# Patient Record
Sex: FEMALE | Race: WHITE | NOT HISPANIC OR LATINO | URBAN - METROPOLITAN AREA
[De-identification: names, ages, dates, MRNs, and addresses within clinical notes are randomized per-mention and may not be internally consistent; named-entity substitution may affect disease eponyms.]

---

## 2017-06-08 ENCOUNTER — INPATIENT (INPATIENT)
Facility: HOSPITAL | Age: 57
LOS: 0 days | Discharge: HOME | End: 2017-06-09
Attending: OBSTETRICS & GYNECOLOGY | Admitting: OBSTETRICS & GYNECOLOGY
Payer: COMMERCIAL

## 2017-06-08 VITALS — HEIGHT: 64 IN | WEIGHT: 162.92 LBS

## 2017-06-08 LAB
ALBUMIN SERPL ELPH-MCNC: 3.4 G/DL — SIGNIFICANT CHANGE UP (ref 3.3–5)
ALP SERPL-CCNC: 77 U/L — SIGNIFICANT CHANGE UP (ref 40–120)
ALT FLD-CCNC: 23 U/L — SIGNIFICANT CHANGE UP (ref 12–78)
ANION GAP SERPL CALC-SCNC: 2 MMOL/L — LOW (ref 5–17)
APPEARANCE UR: CLEAR — SIGNIFICANT CHANGE UP
AST SERPL-CCNC: 33 U/L — SIGNIFICANT CHANGE UP (ref 15–37)
BASOPHILS # BLD AUTO: 0.1 K/UL — SIGNIFICANT CHANGE UP (ref 0–0.2)
BASOPHILS NFR BLD AUTO: 1.1 % — SIGNIFICANT CHANGE UP (ref 0–2)
BILIRUB SERPL-MCNC: 0.7 MG/DL — SIGNIFICANT CHANGE UP (ref 0.2–1.2)
BILIRUB UR-MCNC: NEGATIVE — SIGNIFICANT CHANGE UP
BUN SERPL-MCNC: 12 MG/DL — SIGNIFICANT CHANGE UP (ref 7–23)
CALCIUM SERPL-MCNC: 8.8 MG/DL — SIGNIFICANT CHANGE UP (ref 8.5–10.1)
CHLORIDE SERPL-SCNC: 105 MMOL/L — SIGNIFICANT CHANGE UP (ref 96–108)
CO2 SERPL-SCNC: 29 MMOL/L — SIGNIFICANT CHANGE UP (ref 22–31)
COLOR SPEC: YELLOW — SIGNIFICANT CHANGE UP
CREAT SERPL-MCNC: 0.72 MG/DL — SIGNIFICANT CHANGE UP (ref 0.5–1.3)
DIFF PNL FLD: NEGATIVE — SIGNIFICANT CHANGE UP
EOSINOPHIL # BLD AUTO: 0.1 K/UL — SIGNIFICANT CHANGE UP (ref 0–0.5)
EOSINOPHIL NFR BLD AUTO: 0.5 % — SIGNIFICANT CHANGE UP (ref 0–6)
GLUCOSE SERPL-MCNC: 105 MG/DL — HIGH (ref 70–99)
GLUCOSE UR QL: NEGATIVE MG/DL — SIGNIFICANT CHANGE UP
HCG SERPL-ACNC: 5 MIU/ML — SIGNIFICANT CHANGE UP
HCT VFR BLD CALC: 48.5 % — HIGH (ref 34.5–45)
HGB BLD-MCNC: 16.4 G/DL — HIGH (ref 11.5–15.5)
KETONES UR-MCNC: NEGATIVE — SIGNIFICANT CHANGE UP
LEUKOCYTE ESTERASE UR-ACNC: NEGATIVE — SIGNIFICANT CHANGE UP
LYMPHOCYTES # BLD AUTO: 2.7 K/UL — SIGNIFICANT CHANGE UP (ref 1–3.3)
LYMPHOCYTES # BLD AUTO: 24 % — SIGNIFICANT CHANGE UP (ref 13–44)
MCHC RBC-ENTMCNC: 31.8 PG — SIGNIFICANT CHANGE UP (ref 27–34)
MCHC RBC-ENTMCNC: 33.9 GM/DL — SIGNIFICANT CHANGE UP (ref 32–36)
MCV RBC AUTO: 93.9 FL — SIGNIFICANT CHANGE UP (ref 80–100)
MONOCYTES # BLD AUTO: 0.8 K/UL — SIGNIFICANT CHANGE UP (ref 0–0.9)
MONOCYTES NFR BLD AUTO: 7.3 % — SIGNIFICANT CHANGE UP (ref 2–14)
NEUTROPHILS # BLD AUTO: 7.5 K/UL — HIGH (ref 1.8–7.4)
NEUTROPHILS NFR BLD AUTO: 67.1 % — SIGNIFICANT CHANGE UP (ref 43–77)
NITRITE UR-MCNC: NEGATIVE — SIGNIFICANT CHANGE UP
PH UR: 7 — SIGNIFICANT CHANGE UP (ref 5–8)
PLATELET # BLD AUTO: 187 K/UL — SIGNIFICANT CHANGE UP (ref 150–400)
POTASSIUM SERPL-MCNC: 4.8 MMOL/L — SIGNIFICANT CHANGE UP (ref 3.5–5.3)
POTASSIUM SERPL-SCNC: 4.8 MMOL/L — SIGNIFICANT CHANGE UP (ref 3.5–5.3)
PROT SERPL-MCNC: 7.8 GM/DL — SIGNIFICANT CHANGE UP (ref 6–8.3)
PROT UR-MCNC: NEGATIVE MG/DL — SIGNIFICANT CHANGE UP
RBC # BLD: 5.16 M/UL — SIGNIFICANT CHANGE UP (ref 3.8–5.2)
RBC # FLD: 13.1 % — SIGNIFICANT CHANGE UP (ref 10.3–14.5)
SODIUM SERPL-SCNC: 136 MMOL/L — SIGNIFICANT CHANGE UP (ref 135–145)
SP GR SPEC: 1.01 — SIGNIFICANT CHANGE UP (ref 1.01–1.02)
UROBILINOGEN FLD QL: NEGATIVE MG/DL — SIGNIFICANT CHANGE UP
WBC # BLD: 11.2 K/UL — HIGH (ref 3.8–10.5)
WBC # FLD AUTO: 11.2 K/UL — HIGH (ref 3.8–10.5)

## 2017-06-08 PROCEDURE — 74177 CT ABD & PELVIS W/CONTRAST: CPT | Mod: 26

## 2017-06-08 PROCEDURE — 76856 US EXAM PELVIC COMPLETE: CPT | Mod: 26

## 2017-06-08 RX ORDER — MORPHINE SULFATE 50 MG/1
4 CAPSULE, EXTENDED RELEASE ORAL ONCE
Qty: 0 | Refills: 0 | Status: DISCONTINUED | OUTPATIENT
Start: 2017-06-08 | End: 2017-06-08

## 2017-06-08 RX ORDER — SODIUM CHLORIDE 9 MG/ML
1000 INJECTION INTRAMUSCULAR; INTRAVENOUS; SUBCUTANEOUS ONCE
Qty: 0 | Refills: 0 | Status: COMPLETED | OUTPATIENT
Start: 2017-06-08 | End: 2017-06-08

## 2017-06-08 RX ADMIN — MORPHINE SULFATE 4 MILLIGRAM(S): 50 CAPSULE, EXTENDED RELEASE ORAL at 21:10

## 2017-06-08 RX ADMIN — MORPHINE SULFATE 4 MILLIGRAM(S): 50 CAPSULE, EXTENDED RELEASE ORAL at 20:55

## 2017-06-08 RX ADMIN — MORPHINE SULFATE 4 MILLIGRAM(S): 50 CAPSULE, EXTENDED RELEASE ORAL at 21:30

## 2017-06-08 RX ADMIN — MORPHINE SULFATE 4 MILLIGRAM(S): 50 CAPSULE, EXTENDED RELEASE ORAL at 21:15

## 2017-06-08 RX ADMIN — MORPHINE SULFATE 4 MILLIGRAM(S): 50 CAPSULE, EXTENDED RELEASE ORAL at 23:42

## 2017-06-08 RX ADMIN — MORPHINE SULFATE 4 MILLIGRAM(S): 50 CAPSULE, EXTENDED RELEASE ORAL at 23:55

## 2017-06-08 RX ADMIN — SODIUM CHLORIDE 1000 MILLILITER(S): 9 INJECTION INTRAMUSCULAR; INTRAVENOUS; SUBCUTANEOUS at 20:55

## 2017-06-08 NOTE — ED STATDOCS - PROGRESS NOTE DETAILS
signed Daija Song PA-C Pt seen initially in intake by Dr. Asencio   no external genital lesions, no discharge, bimanual exam no CMT, uterus non tender, not enlarged, no adnexal tenderness or masses. trace blood on tips of glove. Exam chaperoned by LISA Barrientos d/w Dr. guillen for ob/gyn for consult. Attending Elif, plan admit to ob/gyn Attending corbin Asencio/usman de jesus for ob/gyn for consult.

## 2017-06-08 NOTE — ED STATDOCS - ATTENDING CONTRIBUTION TO CARE
I, Madi Asencio MD,  performed the initial face to face bedside interview with this patient regarding history of present illness, review of symptoms and relevant past medical, social and family history.  I completed an independent physical examination.  I was the initial provider who evaluated this patient. I have signed out the follow up of any pending tests (i.e. labs, radiological studies) to the ACP.  I have communicated the patient’s plan of care and disposition with the ACP.  The history, relevant review of systems, past medical and surgical history, medical decision making, and physical examination was documented by the scribe in my presence and I attest to the accuracy of the documentation.

## 2017-06-08 NOTE — ED STATDOCS - OBJECTIVE STATEMENT
55 y/o F 55 y/o F with a h/o , hypothyroid on synthroid, c/o intermittent bright red vaginal bleeding x3 days in the mornings, then would eventually resolve on its own. Denies nausea. Pt noticed the blood after wiping. Denies if she saw blood in toilet. Denies rectal bleeding. Pt has been changing a pad one time per day. Pt c/o lower abd pain since yesterday. Pain worsens with movement, and worsens with bumps in the road with driving. Pt is menopausal. PCP- Nuria. KETTY. Pt current smoker. No illicit drug use. Drinks alcohol on occasion. 57 y/o F with a h/o , hypothyroid on synthroid, c/o intermittent bright red vaginal bleeding x3 days in the mornings, then would eventually resolve on its own. Denies nausea. Pt noticed the blood after wiping. Denies if she saw blood in toilet. Denies rectal bleeding. Pt has been changing a pad one time per day. Pt c/o lower abd pain since yesterday. Pain worsens with movement, and worsens with bumps in the road with driving. Pt had an episode of urinary incontinence a few days ago. She says she drinks water frequently, and "does not urine until the very last minute", and this has been occurring over the passed few months. Pt is menopausal. PCP- Nuria. KETTY. Pt current smoker. No illicit drug use. Drinks alcohol on occasion.

## 2017-06-09 VITALS
TEMPERATURE: 99 F | SYSTOLIC BLOOD PRESSURE: 142 MMHG | OXYGEN SATURATION: 92 % | RESPIRATION RATE: 18 BRPM | DIASTOLIC BLOOD PRESSURE: 88 MMHG | HEART RATE: 86 BPM

## 2017-06-09 DIAGNOSIS — Z29.9 ENCOUNTER FOR PROPHYLACTIC MEASURES, UNSPECIFIED: ICD-10-CM

## 2017-06-09 DIAGNOSIS — N83.201 UNSPECIFIED OVARIAN CYST, RIGHT SIDE: ICD-10-CM

## 2017-06-09 DIAGNOSIS — E03.9 HYPOTHYROIDISM, UNSPECIFIED: ICD-10-CM

## 2017-06-09 LAB
ABO RH CONFIRMATION: SIGNIFICANT CHANGE UP
ALBUMIN SERPL ELPH-MCNC: 3 G/DL — LOW (ref 3.3–5)
ALP SERPL-CCNC: 62 U/L — SIGNIFICANT CHANGE UP (ref 40–120)
ALT FLD-CCNC: 20 U/L — SIGNIFICANT CHANGE UP (ref 12–78)
ANION GAP SERPL CALC-SCNC: 4 MMOL/L — LOW (ref 5–17)
APTT BLD: 30.3 SEC — SIGNIFICANT CHANGE UP (ref 27.5–37.4)
AST SERPL-CCNC: 11 U/L — LOW (ref 15–37)
BASOPHILS # BLD AUTO: 0.1 K/UL — SIGNIFICANT CHANGE UP (ref 0–0.2)
BASOPHILS NFR BLD AUTO: 0.7 % — SIGNIFICANT CHANGE UP (ref 0–2)
BILIRUB SERPL-MCNC: 0.6 MG/DL — SIGNIFICANT CHANGE UP (ref 0.2–1.2)
BLD GP AB SCN SERPL QL: SIGNIFICANT CHANGE UP
BUN SERPL-MCNC: 8 MG/DL — SIGNIFICANT CHANGE UP (ref 7–23)
CALCIUM SERPL-MCNC: 8.3 MG/DL — LOW (ref 8.5–10.1)
CANCER AG125 SERPL-ACNC: 16 U/ML — SIGNIFICANT CHANGE UP
CEA SERPL-MCNC: 3 NG/ML — SIGNIFICANT CHANGE UP (ref 0–3.8)
CHLORIDE SERPL-SCNC: 107 MMOL/L — SIGNIFICANT CHANGE UP (ref 96–108)
CO2 SERPL-SCNC: 28 MMOL/L — SIGNIFICANT CHANGE UP (ref 22–31)
CREAT SERPL-MCNC: 0.46 MG/DL — LOW (ref 0.5–1.3)
CULTURE RESULTS: SIGNIFICANT CHANGE UP
EOSINOPHIL # BLD AUTO: 0.1 K/UL — SIGNIFICANT CHANGE UP (ref 0–0.5)
EOSINOPHIL NFR BLD AUTO: 0.6 % — SIGNIFICANT CHANGE UP (ref 0–6)
GLUCOSE SERPL-MCNC: 88 MG/DL — SIGNIFICANT CHANGE UP (ref 70–99)
HCT VFR BLD CALC: 43 % — SIGNIFICANT CHANGE UP (ref 34.5–45)
HGB BLD-MCNC: 14.9 G/DL — SIGNIFICANT CHANGE UP (ref 11.5–15.5)
INR BLD: 1.11 RATIO — SIGNIFICANT CHANGE UP (ref 0.88–1.16)
LYMPHOCYTES # BLD AUTO: 2.4 K/UL — SIGNIFICANT CHANGE UP (ref 1–3.3)
LYMPHOCYTES # BLD AUTO: 28 % — SIGNIFICANT CHANGE UP (ref 13–44)
MCHC RBC-ENTMCNC: 32.1 PG — SIGNIFICANT CHANGE UP (ref 27–34)
MCHC RBC-ENTMCNC: 34.7 GM/DL — SIGNIFICANT CHANGE UP (ref 32–36)
MCV RBC AUTO: 92.5 FL — SIGNIFICANT CHANGE UP (ref 80–100)
MONOCYTES # BLD AUTO: 0.7 K/UL — SIGNIFICANT CHANGE UP (ref 0–0.9)
MONOCYTES NFR BLD AUTO: 7.8 % — SIGNIFICANT CHANGE UP (ref 2–14)
NEUTROPHILS # BLD AUTO: 5.4 K/UL — SIGNIFICANT CHANGE UP (ref 1.8–7.4)
NEUTROPHILS NFR BLD AUTO: 62.9 % — SIGNIFICANT CHANGE UP (ref 43–77)
PLATELET # BLD AUTO: 149 K/UL — LOW (ref 150–400)
POTASSIUM SERPL-MCNC: 3.5 MMOL/L — SIGNIFICANT CHANGE UP (ref 3.5–5.3)
POTASSIUM SERPL-SCNC: 3.5 MMOL/L — SIGNIFICANT CHANGE UP (ref 3.5–5.3)
PROT SERPL-MCNC: 6.5 GM/DL — SIGNIFICANT CHANGE UP (ref 6–8.3)
PROTHROM AB SERPL-ACNC: 12 SEC — SIGNIFICANT CHANGE UP (ref 9.8–12.7)
RBC # BLD: 4.65 M/UL — SIGNIFICANT CHANGE UP (ref 3.8–5.2)
RBC # FLD: 13 % — SIGNIFICANT CHANGE UP (ref 10.3–14.5)
SODIUM SERPL-SCNC: 139 MMOL/L — SIGNIFICANT CHANGE UP (ref 135–145)
SPECIMEN SOURCE: SIGNIFICANT CHANGE UP
TYPE + AB SCN PNL BLD: SIGNIFICANT CHANGE UP
WBC # BLD: 8.6 K/UL — SIGNIFICANT CHANGE UP (ref 3.8–10.5)
WBC # FLD AUTO: 8.6 K/UL — SIGNIFICANT CHANGE UP (ref 3.8–10.5)

## 2017-06-09 PROCEDURE — 93010 ELECTROCARDIOGRAM REPORT: CPT

## 2017-06-09 PROCEDURE — 99285 EMERGENCY DEPT VISIT HI MDM: CPT

## 2017-06-09 PROCEDURE — 71010: CPT | Mod: 26

## 2017-06-09 RX ORDER — SODIUM CHLORIDE 9 MG/ML
1000 INJECTION INTRAMUSCULAR; INTRAVENOUS; SUBCUTANEOUS
Qty: 0 | Refills: 0 | Status: DISCONTINUED | OUTPATIENT
Start: 2017-06-09 | End: 2017-06-09

## 2017-06-09 RX ORDER — MORPHINE SULFATE 50 MG/1
4 CAPSULE, EXTENDED RELEASE ORAL EVERY 6 HOURS
Qty: 0 | Refills: 0 | Status: DISCONTINUED | OUTPATIENT
Start: 2017-06-09 | End: 2017-06-09

## 2017-06-09 RX ORDER — MORPHINE SULFATE 50 MG/1
2 CAPSULE, EXTENDED RELEASE ORAL
Qty: 0 | Refills: 0 | Status: DISCONTINUED | OUTPATIENT
Start: 2017-06-09 | End: 2017-06-09

## 2017-06-09 RX ORDER — LEVOTHYROXINE SODIUM 125 MCG
100 TABLET ORAL DAILY
Qty: 0 | Refills: 0 | Status: DISCONTINUED | OUTPATIENT
Start: 2017-06-09 | End: 2017-06-09

## 2017-06-09 RX ADMIN — MORPHINE SULFATE 2 MILLIGRAM(S): 50 CAPSULE, EXTENDED RELEASE ORAL at 02:15

## 2017-06-09 RX ADMIN — MORPHINE SULFATE 4 MILLIGRAM(S): 50 CAPSULE, EXTENDED RELEASE ORAL at 06:49

## 2017-06-09 RX ADMIN — MORPHINE SULFATE 2 MILLIGRAM(S): 50 CAPSULE, EXTENDED RELEASE ORAL at 04:15

## 2017-06-09 RX ADMIN — Medication 100 MICROGRAM(S): at 02:16

## 2017-06-09 RX ADMIN — SODIUM CHLORIDE 125 MILLILITER(S): 9 INJECTION INTRAMUSCULAR; INTRAVENOUS; SUBCUTANEOUS at 02:16

## 2017-06-09 RX ADMIN — MORPHINE SULFATE 2 MILLIGRAM(S): 50 CAPSULE, EXTENDED RELEASE ORAL at 02:41

## 2017-06-09 RX ADMIN — MORPHINE SULFATE 2 MILLIGRAM(S): 50 CAPSULE, EXTENDED RELEASE ORAL at 04:30

## 2017-06-09 RX ADMIN — MORPHINE SULFATE 2 MILLIGRAM(S): 50 CAPSULE, EXTENDED RELEASE ORAL at 10:01

## 2017-06-09 RX ADMIN — MORPHINE SULFATE 2 MILLIGRAM(S): 50 CAPSULE, EXTENDED RELEASE ORAL at 10:30

## 2017-06-09 NOTE — DISCHARGE NOTE ADULT - PLAN OF CARE
to not have further pain - follow up with GYN Surgery Dr Sepulveda @1600 today   - u/s and ct results to be faxed to Dr Sepulveda office  - medical clearance via PCP Dr Quiñones at 1215 today to not have abnormal thyroid levels - take medications as prescribed   - follow up with PMD

## 2017-06-09 NOTE — H&P ADULT - NSHPSOCIALHISTORY_GEN_ALL_CORE
, lives at home with spouse, Current smoker- 2ppd x 45years, social drinker, denies recreational drug use

## 2017-06-09 NOTE — DISCHARGE NOTE ADULT - ADDITIONAL INSTRUCTIONS
Patient has a long smoking history with ?emphysema will need medical clearance prior to possible gyn surgery for ovarian cysts. To follow up with PCP Dr Quiñones @1215 today and GYN Surgery Dr Sepulveda 1600 today for further management     If you have fever >100.3, uncontrolled abdominal pain, chest pain, shortness of breath, headache with vision changes, severe nausea vomiting or diarrhea come to ED or call PMD immediately

## 2017-06-09 NOTE — H&P ADULT - PROBLEM SELECTOR PLAN 1
-Admit to GYN  -Pain management  -Continue IVF- NS @ 125 cc/hr  -Will discuss case with Dr. Sepulveda/Dr. Zuluaga in AM to further evaluate need for surgery

## 2017-06-09 NOTE — DISCHARGE NOTE ADULT - PATIENT PORTAL LINK FT
“You can access the [facility] Patient Portal, offered by [facility], by registering with the following website: [email or web address]”

## 2017-06-09 NOTE — PROGRESS NOTE ADULT - SUBJECTIVE AND OBJECTIVE BOX
Patient seen and evaluated at bedside. She is still feeling pain but significantly improved.     Tumor markers CEA and  were drawn earlier. Dr. Zuluaga and Dr. Sepulveda unavailable to perform surgery today. Case was also discussed with GYN Oncologist, Dr. Martinez, who offered to see patient today as outpatient. Discussed options with patient and she would like to see Dr. Sepulveda and feels that she can be evaluated as outpatient, as her pain is better controlled at this time. Was able to schedule an appointment today with Dr. Sepulveda at 16:00.    Medicine team was consulted for medical clearance. EKG/chest X ray done. Since patient will not be having surgery today, she can complete medical clearance as outpatient and was able to schedule a visit with her PCP later today as well.

## 2017-06-09 NOTE — DISCHARGE NOTE ADULT - CARE PROVIDER_API CALL
Janak Quiñones), Family Medicine  [address] [place] [address]  Phone: [phone]  Fax: [phone]    Nader Sepulveda), Obstetrics and Gynecology  [address] [place] [address]  Phone: [phone]  Fax: [phone]

## 2017-06-09 NOTE — DISCHARGE NOTE ADULT - CARE PLAN
Principal Discharge DX:	Cyst of right ovary  Goal:	to not have further pain  Instructions for follow-up, activity and diet:	- follow up with GYN Surgery Dr Sepulveda @1600 today   - u/s and ct results to be faxed to Dr Sepulveda office  - medical clearance via PCP Dr Quiñones at 1215 today  Secondary Diagnosis:	Hypothyroid  Goal:	to not have abnormal thyroid levels  Instructions for follow-up, activity and diet:	- take medications as prescribed   - follow up with PMD

## 2017-06-09 NOTE — H&P ADULT - ASSESSMENT
57 y/o F PMHx hypothyroidism presents to ED with complaint of vaginal bleeding x 3 days and severe RLQ pain x 1 day being admitted for large R ovarian cystic lesion

## 2017-06-09 NOTE — H&P ADULT - HISTORY OF PRESENT ILLNESS
55 y/o F PMHx hypothyroidism presents to ED with complaint of vaginal bleeding x 3 days and severe RLQ pain x 1 day. Patient states she last had her menstrual period 6 years ago. States 3 days PTA, in AM, when she went to [facility], she noticed bright red blood on the toilet paper. Patient denied any pain at that time, denied any blood in urine or blood in toilet bowl. Patient states she applied a pad, but most bleeding resolved on its own. Patient had same experience 2 days PTA. Patient states yesterday, she began to notice pain in her RLQ and suprapubic region. States initially she was able to work through it, but pain became sharp and stabbing (8/10 in severity). Patient states she was unable to get up from laying down position without help from her . Patient states pain is worse with movement, better with rest. Since pain persisted, patient decided to come to ED for evaluation.    In ED, patient received morphine 4mg IV x3, 1L NS bolus x1. Labs significant for leukocytosis- WBC 11.2, UA neg. Patient had pelvic US which showed large right ovarian cystic lesions, Patient reported pain during the examination, clinical correlation is advised to assess for ovarian torsion as focal lesions may serve as a lead point, given the size. Neoplasm cannot be excluded in a postmenopausal patient. Patient had CT abd/pelvis which showed large right adnexal cystic lesion. Clinical relation is advised to assess ovarian torsion as focal lesion may serve as a lead point (given the size). Neoplasm cannot be excluded in a postmenopausal patient and follow-up pelvic MRI is recommended for further evaluation.

## 2017-06-09 NOTE — CHART NOTE - NSCHARTNOTEFT_GEN_A_CORE
outpatient medical appointments scheduled for patient     PCP Dr Quiñones- 1215 today    OBGYN Surgery Dr Sepulveda 1600today outpatient medical appointments for follow up scheduled for patient by me     PCP Dr Quiñones- 1215 today    OBGYN Surgery Dr Sepulveda 1600today outpatient medical appointments for follow up scheduled for patient by me     PCP Dr Quiñones- 1215 today    OBGYN Surgery Dr Sepulveda 1600 today

## 2017-06-09 NOTE — PROGRESS NOTE ADULT - ASSESSMENT
55 yo P1 with RLQ pain and large ovarian cystic mass seen on imaging  -pain improved  -will d/c home and will follow up with PCP and Dr. Sepulveda in the office later today

## 2017-06-09 NOTE — DISCHARGE NOTE ADULT - HOSPITAL COURSE
55 y/o F PMHx hypothyroidism presents to ED with complaint of vaginal bleeding x 3 days and severe RLQ pain x 1 day. Patient states she last had her menstrual period 6 years ago. States 3 days PTA, in AM, when she went to pee, she noticed bright red blood on the toilet paper.     Patient was admitted to GYN service and given pain relief. CEA and  drawn    Patient being discharged in stable condition with appropriate follow up with PCP Dr Quiñones and GYN Surgery Dr Sepulveda for today at 1215 and 1600 respectively.

## 2017-06-09 NOTE — DISCHARGE NOTE ADULT - MEDICATION SUMMARY - MEDICATIONS TO TAKE
I will START or STAY ON the medications listed below when I get home from the hospital:    oxycodone-acetaminophen 5mg-325mg oral tablet  -- 1 tab(s) by mouth every 6 hours, As Needed -for severe pain MDD:MDD 4  -- Caution federal law prohibits the transfer of this drug to any person other  than the person for whom it was prescribed.  May cause drowsiness.  Alcohol may intensify this effect.  Use care when operating dangerous machinery.  This prescription cannot be refilled.  This product contains acetaminophen.  Do not use  with any other product containing acetaminophen to prevent possible liver damage.  Using more of this medication than prescribed may cause serious breathing problems.    -- Indication: For pain    levothyroxine 100 mcg (0.1 mg) oral tablet  -- 1 tab(s) by mouth once a day  -- Indication: For Hypothyroid

## 2017-06-11 ENCOUNTER — INPATIENT (INPATIENT)
Facility: HOSPITAL | Age: 57
LOS: 7 days | Discharge: HOME | DRG: 742 | End: 2017-06-19
Attending: OBSTETRICS & GYNECOLOGY | Admitting: OBSTETRICS & GYNECOLOGY
Payer: COMMERCIAL

## 2017-06-11 VITALS
OXYGEN SATURATION: 97 % | RESPIRATION RATE: 16 BRPM | HEART RATE: 110 BPM | TEMPERATURE: 99 F | SYSTOLIC BLOOD PRESSURE: 131 MMHG | DIASTOLIC BLOOD PRESSURE: 85 MMHG

## 2017-06-11 DIAGNOSIS — F17.200 NICOTINE DEPENDENCE, UNSPECIFIED, UNCOMPLICATED: ICD-10-CM

## 2017-06-11 DIAGNOSIS — R10.9 UNSPECIFIED ABDOMINAL PAIN: ICD-10-CM

## 2017-06-11 DIAGNOSIS — R10.2 PELVIC AND PERINEAL PAIN: ICD-10-CM

## 2017-06-11 DIAGNOSIS — E03.9 HYPOTHYROIDISM, UNSPECIFIED: ICD-10-CM

## 2017-06-11 DIAGNOSIS — N95.0 POSTMENOPAUSAL BLEEDING: ICD-10-CM

## 2017-06-11 LAB
ALBUMIN SERPL ELPH-MCNC: 3.8 G/DL — SIGNIFICANT CHANGE UP (ref 3.3–5)
ALP SERPL-CCNC: 64 U/L — SIGNIFICANT CHANGE UP (ref 40–120)
ALT FLD-CCNC: 19 U/L RC — SIGNIFICANT CHANGE UP (ref 10–45)
ANION GAP SERPL CALC-SCNC: 13 MMOL/L — SIGNIFICANT CHANGE UP (ref 5–17)
APTT BLD: 29 SEC — SIGNIFICANT CHANGE UP (ref 27.5–37.4)
AST SERPL-CCNC: 21 U/L — SIGNIFICANT CHANGE UP (ref 10–40)
BASOPHILS # BLD AUTO: 0 K/UL — SIGNIFICANT CHANGE UP (ref 0–0.2)
BASOPHILS NFR BLD AUTO: 0.2 % — SIGNIFICANT CHANGE UP (ref 0–2)
BILIRUB SERPL-MCNC: 0.3 MG/DL — SIGNIFICANT CHANGE UP (ref 0.2–1.2)
BLD GP AB SCN SERPL QL: NEGATIVE — SIGNIFICANT CHANGE UP
BUN SERPL-MCNC: 11 MG/DL — SIGNIFICANT CHANGE UP (ref 7–23)
CALCIUM SERPL-MCNC: 9.6 MG/DL — SIGNIFICANT CHANGE UP (ref 8.4–10.5)
CHLORIDE SERPL-SCNC: 101 MMOL/L — SIGNIFICANT CHANGE UP (ref 96–108)
CO2 SERPL-SCNC: 26 MMOL/L — SIGNIFICANT CHANGE UP (ref 22–31)
CREAT SERPL-MCNC: 0.73 MG/DL — SIGNIFICANT CHANGE UP (ref 0.5–1.3)
EOSINOPHIL # BLD AUTO: 0 K/UL — SIGNIFICANT CHANGE UP (ref 0–0.5)
EOSINOPHIL NFR BLD AUTO: 0.5 % — SIGNIFICANT CHANGE UP (ref 0–6)
GLUCOSE SERPL-MCNC: 122 MG/DL — HIGH (ref 70–99)
HCG UR QL: NEGATIVE — SIGNIFICANT CHANGE UP
HCT VFR BLD CALC: 45.9 % — HIGH (ref 34.5–45)
HGB BLD-MCNC: 15.7 G/DL — HIGH (ref 11.5–15.5)
INR BLD: 1.1 RATIO — SIGNIFICANT CHANGE UP (ref 0.88–1.16)
LYMPHOCYTES # BLD AUTO: 2.3 K/UL — SIGNIFICANT CHANGE UP (ref 1–3.3)
LYMPHOCYTES # BLD AUTO: 24.8 % — SIGNIFICANT CHANGE UP (ref 13–44)
MCHC RBC-ENTMCNC: 32.9 PG — SIGNIFICANT CHANGE UP (ref 27–34)
MCHC RBC-ENTMCNC: 34.1 GM/DL — SIGNIFICANT CHANGE UP (ref 32–36)
MCV RBC AUTO: 96.4 FL — SIGNIFICANT CHANGE UP (ref 80–100)
MONOCYTES # BLD AUTO: 0.6 K/UL — SIGNIFICANT CHANGE UP (ref 0–0.9)
MONOCYTES NFR BLD AUTO: 6.2 % — SIGNIFICANT CHANGE UP (ref 2–14)
NEUTROPHILS # BLD AUTO: 6.4 K/UL — SIGNIFICANT CHANGE UP (ref 1.8–7.4)
NEUTROPHILS NFR BLD AUTO: 68.3 % — SIGNIFICANT CHANGE UP (ref 43–77)
PLATELET # BLD AUTO: 176 K/UL — SIGNIFICANT CHANGE UP (ref 150–400)
POTASSIUM SERPL-MCNC: 4.2 MMOL/L — SIGNIFICANT CHANGE UP (ref 3.5–5.3)
POTASSIUM SERPL-SCNC: 4.2 MMOL/L — SIGNIFICANT CHANGE UP (ref 3.5–5.3)
PROT SERPL-MCNC: 7.5 G/DL — SIGNIFICANT CHANGE UP (ref 6–8.3)
PROTHROM AB SERPL-ACNC: 11.9 SEC — SIGNIFICANT CHANGE UP (ref 9.8–12.7)
RBC # BLD: 4.76 M/UL — SIGNIFICANT CHANGE UP (ref 3.8–5.2)
RBC # FLD: 12.8 % — SIGNIFICANT CHANGE UP (ref 10.3–14.5)
RH IG SCN BLD-IMP: POSITIVE — SIGNIFICANT CHANGE UP
RH IG SCN BLD-IMP: POSITIVE — SIGNIFICANT CHANGE UP
SODIUM SERPL-SCNC: 140 MMOL/L — SIGNIFICANT CHANGE UP (ref 135–145)
WBC # BLD: 9.3 K/UL — SIGNIFICANT CHANGE UP (ref 3.8–10.5)
WBC # FLD AUTO: 9.3 K/UL — SIGNIFICANT CHANGE UP (ref 3.8–10.5)

## 2017-06-11 PROCEDURE — 99285 EMERGENCY DEPT VISIT HI MDM: CPT

## 2017-06-11 PROCEDURE — 88305 TISSUE EXAM BY PATHOLOGIST: CPT | Mod: 26

## 2017-06-11 PROCEDURE — 58120 DILATION AND CURETTAGE: CPT | Mod: 59

## 2017-06-11 PROCEDURE — 58720 REMOVAL OF OVARY/TUBE(S): CPT

## 2017-06-11 PROCEDURE — 99223 1ST HOSP IP/OBS HIGH 75: CPT

## 2017-06-11 RX ORDER — NICOTINE POLACRILEX 2 MG
1 GUM BUCCAL DAILY
Qty: 0 | Refills: 0 | Status: DISCONTINUED | OUTPATIENT
Start: 2017-06-11 | End: 2017-06-13

## 2017-06-11 RX ORDER — SODIUM CHLORIDE 9 MG/ML
1000 INJECTION INTRAMUSCULAR; INTRAVENOUS; SUBCUTANEOUS ONCE
Qty: 0 | Refills: 0 | Status: COMPLETED | OUTPATIENT
Start: 2017-06-11 | End: 2017-06-11

## 2017-06-11 RX ORDER — SODIUM CHLORIDE 9 MG/ML
1000 INJECTION, SOLUTION INTRAVENOUS
Qty: 0 | Refills: 0 | Status: DISCONTINUED | OUTPATIENT
Start: 2017-06-11 | End: 2017-06-11

## 2017-06-11 RX ORDER — KETOROLAC TROMETHAMINE 30 MG/ML
30 SYRINGE (ML) INJECTION EVERY 6 HOURS
Qty: 0 | Refills: 0 | Status: DISCONTINUED | OUTPATIENT
Start: 2017-06-11 | End: 2017-06-13

## 2017-06-11 RX ORDER — LEVOTHYROXINE SODIUM 125 MCG
1 TABLET ORAL
Qty: 0 | Refills: 0 | COMMUNITY

## 2017-06-11 RX ORDER — SODIUM CHLORIDE 9 MG/ML
1000 INJECTION, SOLUTION INTRAVENOUS
Qty: 0 | Refills: 0 | Status: DISCONTINUED | OUTPATIENT
Start: 2017-06-11 | End: 2017-06-12

## 2017-06-11 RX ORDER — LEVOTHYROXINE SODIUM 125 MCG
100 TABLET ORAL DAILY
Qty: 0 | Refills: 0 | Status: DISCONTINUED | OUTPATIENT
Start: 2017-06-11 | End: 2017-06-13

## 2017-06-11 RX ORDER — NICOTINE POLACRILEX 2 MG
1 GUM BUCCAL DAILY
Qty: 0 | Refills: 0 | Status: DISCONTINUED | OUTPATIENT
Start: 2017-06-11 | End: 2017-06-11

## 2017-06-11 RX ORDER — MORPHINE SULFATE 50 MG/1
4 CAPSULE, EXTENDED RELEASE ORAL ONCE
Qty: 0 | Refills: 0 | Status: DISCONTINUED | OUTPATIENT
Start: 2017-06-11 | End: 2017-06-11

## 2017-06-11 RX ORDER — MORPHINE SULFATE 50 MG/1
4 CAPSULE, EXTENDED RELEASE ORAL EVERY 4 HOURS
Qty: 0 | Refills: 0 | Status: DISCONTINUED | OUTPATIENT
Start: 2017-06-11 | End: 2017-06-11

## 2017-06-11 RX ADMIN — Medication 1 PATCH: at 22:19

## 2017-06-11 RX ADMIN — MORPHINE SULFATE 4 MILLIGRAM(S): 50 CAPSULE, EXTENDED RELEASE ORAL at 21:30

## 2017-06-11 RX ADMIN — SODIUM CHLORIDE 1000 MILLILITER(S): 9 INJECTION INTRAMUSCULAR; INTRAVENOUS; SUBCUTANEOUS at 18:21

## 2017-06-11 RX ADMIN — Medication 30 MILLIGRAM(S): at 22:46

## 2017-06-11 RX ADMIN — Medication 100 MICROGRAM(S): at 22:37

## 2017-06-11 RX ADMIN — Medication 30 MILLIGRAM(S): at 23:16

## 2017-06-11 RX ADMIN — MORPHINE SULFATE 4 MILLIGRAM(S): 50 CAPSULE, EXTENDED RELEASE ORAL at 18:26

## 2017-06-11 NOTE — H&P ADULT - NSHPPHYSICALEXAM_GEN_ALL_CORE
Vital Signs Last 24 Hrs  T(C): 36.7, Max: 37.2 (06-11 @ 17:05)  T(F): 98, Max: 99 (06-11 @ 17:05)  HR: 102 (102 - 110)  BP: 131/92 (131/85 - 131/92)  BP(mean): --  RR: 16 (16 - 16)  SpO2: 95% (95% - 97%)    PHYSICAL EXAM:      Gen: NAD, alert and oriented x 3    Cardiovascular: regular rate and rhythm, S1 and S2 present     Respiratory: CTAB    Abd: soft, mild RLQ tenderness, non-distended, + bowel sounds. No rebound, guarding    Pelvic:  no CMT, Uterus: normal size, non tender, brown vaginal lochia noted    Adnexa: R adnexal fullness    Extremities: NTBL    Skin: warm and well perfused

## 2017-06-11 NOTE — H&P ADULT - REASON FOR ADMISSION
RLQ pain, vaginal spotting, found to have 14cm R adnexal mass RLQ pain, vaginal spotting, found to have 15cm R adnexal mass

## 2017-06-11 NOTE — ED PROVIDER NOTE - OBJECTIVE STATEMENT
56 year old female with PMHx of Graves disease, hypothyroidism on Synthroid, 7 years post menopausal, presents with worsening right sided pelvic pain since Thursday. Pt states she noted mild vaginal bleeding on Tuesday and then developed pain. States pain is worsened with movement. Unable to ambulate 2/2 to pain. Went to [facility] ED and found to have 14 cm  right ovarian cyst. Pt was admitted for surgery but was unable to have surgery there because they did not have gyn/onc. Pt was discharged and referred to outpatient gyn/onc but was not able to get surgery done because of insurance complication. So pt presented today for worsening pain. No recent fevers, chills, N/V, urinary changes, CP, SOB or other complaints.

## 2017-06-11 NOTE — ED ADULT NURSE NOTE - OBJECTIVE STATEMENT
pt to room 14 c/o severe r sided abd pain. pt dx with ovarian mass . pt awake alert color good skin warm dry lungs cl abd soft bloated tender to pal on r side. pt casmr to ed to have surg.

## 2017-06-11 NOTE — H&P ADULT - PROBLEM SELECTOR PLAN 1
most likely 2/2 15cm R adnexal mass seen on CT A/P, TVUS (6/9)  -morphine for analagesia  -tumor markers in AM   -for ONC eval in AM  -for possible surgery tomorrow as add-on  -for medical clearance  -IVF, NPO after midnight

## 2017-06-11 NOTE — PATIENT PROFILE ADULT. - VISION (WITH CORRECTIVE LENSES IF THE PATIENT USUALLY WEARS THEM):
Partially impaired: cannot see medication labels or newsprint, but can see obstacles in path, and the surrounding layout; can count fingers at arm's length/distance

## 2017-06-11 NOTE — ED PROVIDER NOTE - NS ED MD SCRIBE ATTENDING SCRIBE SECTIONS
HIV/INTAKE ASSESSMENT/SCREENINGS/PHYSICAL EXAM/HISTORY OF PRESENT ILLNESS/VITAL SIGNS( Pullset)/PAST MEDICAL/SURGICAL/SOCIAL HISTORY/REVIEW OF SYSTEMS

## 2017-06-11 NOTE — H&P ADULT - HISTORY OF PRESENT ILLNESS
56 , LMP  presents with vaginal spotting that began Tuesday (), and subsequent acute RLQ pain that began Thursday (). Pt was evaluated at [place] [facility] (), TVUS and CT scan showing 15cm R adnexal cyst with few septations, homogenous in nature. Pt was initially scheduled for surgery ; however, surgery was canceled as GYN wanted GYN/ONC as backup. Pt was d/c with percocet and was to f/u with Dr. Sepulveda. Pt was unable to f/u due to insurance complications and presented in ED today with persistent pain. Pt reports pain as sharp and pulling in nature. Pain is worsened with movement, pt is unable to ambulate. Pt denies nausea, vomiting, fevers, chills, chest pain, SOB, anorexia, changes in GI/ fxn.    OB/GYN HISTORY:   G1: C/S  x1       Last Menstrual Period:     Name of GYN Physician: Dr. Bettina Griffiths" [phone], possibly retired  Date of Last Pap:  History of Abnormal Pap: no abnormal pap  Date of Last Mammogram: , wnl  Date of Last Colonoscopy: N/A  Contraception: none currently, was on Depo 7 years prior     PAST MEDICAL & SURGICAL HISTORY:  Hypothyroid  Delivered by  section      MEDICATIONS  (STANDING):  nicotine - 21 mG/24Hr(s) Patch 1patch Transdermal daily  lactated ringers. 1000milliLiter(s) IV Continuous <Continuous>    Allergies    No Known Allergies    Intolerances        SOCIAL HISTORY: current smoker- 2 pack/day x40yrs    FAMILY HISTORY:  No pertinent family history in first degree relatives

## 2017-06-11 NOTE — H&P ADULT - ATTENDING COMMENTS
Pt with large 15cm complex right ovarian cyst and abdominal pain. Given second visit to ED w/in one week, admit for management.     Pt seen and examined. Agree with above resident note with the following additions. Pt to be admitted to GYN. GYN ONC consult in AM. Will send completion of tumor markers in AM. Endometrial biopsy tonight (rush). NPO after midnight. IVF. Toradol prn pain. Morphine for severe pain. Nicotine patch. If pt to go to OR this admission, will get medical clearance preop.    KAVEH Gastelum

## 2017-06-12 DIAGNOSIS — Z01.818 ENCOUNTER FOR OTHER PREPROCEDURAL EXAMINATION: ICD-10-CM

## 2017-06-12 DIAGNOSIS — R19.00 INTRA-ABDOMINAL AND PELVIC SWELLING, MASS AND LUMP, UNSPECIFIED SITE: ICD-10-CM

## 2017-06-12 LAB
BLD GP AB SCN SERPL QL: NEGATIVE — SIGNIFICANT CHANGE UP
CANCER AG19-9 SERPL-ACNC: 12.2 U/ML — SIGNIFICANT CHANGE UP
HCG-TM SERPL-ACNC: 3 MIU/ML — SIGNIFICANT CHANGE UP
LDH SERPL L TO P-CCNC: 150 U/L — SIGNIFICANT CHANGE UP (ref 50–242)
RH IG SCN BLD-IMP: POSITIVE — SIGNIFICANT CHANGE UP

## 2017-06-12 PROCEDURE — 99223 1ST HOSP IP/OBS HIGH 75: CPT | Mod: GC

## 2017-06-12 RX ORDER — MORPHINE SULFATE 50 MG/1
2 CAPSULE, EXTENDED RELEASE ORAL ONCE
Qty: 0 | Refills: 0 | Status: DISCONTINUED | OUTPATIENT
Start: 2017-06-12 | End: 2017-06-12

## 2017-06-12 RX ORDER — SODIUM CHLORIDE 9 MG/ML
1000 INJECTION, SOLUTION INTRAVENOUS
Qty: 0 | Refills: 0 | Status: DISCONTINUED | OUTPATIENT
Start: 2017-06-12 | End: 2017-06-13

## 2017-06-12 RX ORDER — HEPARIN SODIUM 5000 [USP'U]/ML
5000 INJECTION INTRAVENOUS; SUBCUTANEOUS EVERY 12 HOURS
Qty: 0 | Refills: 0 | Status: DISCONTINUED | OUTPATIENT
Start: 2017-06-12 | End: 2017-06-13

## 2017-06-12 RX ORDER — MORPHINE SULFATE 50 MG/1
4 CAPSULE, EXTENDED RELEASE ORAL EVERY 4 HOURS
Qty: 0 | Refills: 0 | Status: DISCONTINUED | OUTPATIENT
Start: 2017-06-12 | End: 2017-06-13

## 2017-06-12 RX ADMIN — HEPARIN SODIUM 5000 UNIT(S): 5000 INJECTION INTRAVENOUS; SUBCUTANEOUS at 18:32

## 2017-06-12 RX ADMIN — Medication 30 MILLIGRAM(S): at 09:36

## 2017-06-12 RX ADMIN — Medication 30 MILLIGRAM(S): at 05:29

## 2017-06-12 RX ADMIN — MORPHINE SULFATE 4 MILLIGRAM(S): 50 CAPSULE, EXTENDED RELEASE ORAL at 23:58

## 2017-06-12 RX ADMIN — Medication 1 PATCH: at 13:24

## 2017-06-12 RX ADMIN — MORPHINE SULFATE 2 MILLIGRAM(S): 50 CAPSULE, EXTENDED RELEASE ORAL at 00:05

## 2017-06-12 RX ADMIN — MORPHINE SULFATE 2 MILLIGRAM(S): 50 CAPSULE, EXTENDED RELEASE ORAL at 00:35

## 2017-06-12 RX ADMIN — Medication 1 PATCH: at 22:07

## 2017-06-12 RX ADMIN — Medication 30 MILLIGRAM(S): at 04:59

## 2017-06-12 RX ADMIN — MORPHINE SULFATE 4 MILLIGRAM(S): 50 CAPSULE, EXTENDED RELEASE ORAL at 18:33

## 2017-06-12 RX ADMIN — MORPHINE SULFATE 4 MILLIGRAM(S): 50 CAPSULE, EXTENDED RELEASE ORAL at 19:00

## 2017-06-12 RX ADMIN — MORPHINE SULFATE 4 MILLIGRAM(S): 50 CAPSULE, EXTENDED RELEASE ORAL at 13:24

## 2017-06-12 RX ADMIN — MORPHINE SULFATE 4 MILLIGRAM(S): 50 CAPSULE, EXTENDED RELEASE ORAL at 14:00

## 2017-06-12 RX ADMIN — Medication 30 MILLIGRAM(S): at 10:00

## 2017-06-12 RX ADMIN — Medication 100 MICROGRAM(S): at 23:58

## 2017-06-12 NOTE — PROGRESS NOTE ADULT - SUBJECTIVE AND OBJECTIVE BOX
HD#2    Patient seen and examined at bedside, no acute overnight events. Pt c/o pain which is well controlled overnight with Morphine @ 10pm and Toradol @ 5am. NPO overnight.     Patient is ambulating, passing flatus, voiding spontaneously with frequency. Denies CP, SOB, N/V, fevers, and chills.    Vital Signs Last 24 Hours  T(C): 36.5, Max: 37.2 (06-11 @ 17:05)  HR: 80 (80 - 110)  BP: 147/81 (127/84 - 147/81)  RR: 16 (16 - 17)  SpO2: 95% (94% - 97%)    I&O's Summary    I & Os for current day (as of 12 Jun 2017 07:07)  =============================================  IN: 1990 ml / OUT: 0 ml / NET: 1990 ml    Physical Exam:  General: NAD  CV: NR, RR, S1, S2, no M/R/G  Lungs: CTA-B  Abdomen: nondistended, Soft, +BS, mild tenderness in RLQ/lower abdomen   Ext: No pain or swelling    Labs:                        15.7   9.3   )-----------( 176      ( 11 Jun 2017 18:41 )             45.9   baso 0.2    eos 0.5    imm gran x      lymph 24.8   mono 6.2    poly 68.3     06-11    140  |  101  |  11  ----------------------------<  122<H>  4.2   |  26  |  0.73    Ca    9.6      11 Jun 2017 18:41    TPro  7.5  /  Alb  3.8  /  TBili  0.3  /  DBili  x   /  AST  21  /  ALT  19  /  AlkPhos  64  06-11    PT/INR - ( 11 Jun 2017 18:41 )   PT: 11.9 sec;   INR: 1.10 ratio       PTT - ( 11 Jun 2017 18:41 )  PTT:29.0 sec    Blood Type: O Positive    MEDICATIONS  (STANDING):  nicotine - 21 mG/24Hr(s) Patch 1patch Transdermal daily  levothyroxine 100MICROGram(s) Oral daily  lactated ringers. 1000milliLiter(s) IV Continuous <Continuous>    MEDICATIONS  (PRN):  ketorolac   Injectable 30milliGRAM(s) IV Push every 6 hours PRN Mild Pain (1 - 3)    CTAP:     FINDINGS:    LOWER CHEST: Subsegmental atelectasis. Paraseptal/centrilobular emphysema.    LIVER: A 1.1 x 1.0 cm left hepatic cyst.  GALLBLADDER/BILE DUCTS: No intrahepatic or extrahepatic biliary   dilatation. Cholelithiasis.   PANCREAS: Unremarkable.  SPLEEN: Unremarkable.    ADRENALS: Unremarkable.  KIDNEYS/URETERS: No hydronephrosis, hydroureter or perinephric stranding.   Subcentimeter hypodense focus in the left lower renal pole, too small to   characterize.   BLADDER: Unremarkable.    REPRODUCTIVE ORGANS: Again noted large cystic lesion in the right adnexa   measuring up to 15.2 x 14.7 x 15.5 cm. Unremarkable uterus and left   adnexa.    BOWEL: No bowel obstruction. Unremarkable appendix. No significant bowel   wall thickening or inflammatory change.  Colon diverticulosis.  PERITONEUM: No drainable fluid collection or free air. Trace free fluid   in the right lower quadrant and pelvis.  VESSELS: Atherosclerotic change of the abdominal aorta and its branches.   RETROPERITONEUM: No lymphadenopathy.    ABDOMINAL WALL/SOFT TISSUES: Small fat-containing umbilical hernia.  BONES: Degenerative changes of the spine.     IMPRESSION:     Large right adnexal cystic lesion. Clinical relation is advised to assess  ovarian torsion as focal lesion may serve as a lead point (given the   size). Neoplasm cannot be excluded in a postmenopausal patient and   follow-up pelvic MRI is recommended for further evaluation.      TVUS:    Findings:     Uterus: Measures 6.2 x 2.3 x 3.3 cm. Unremarkable.  Endometrium: Measures 0.2 cm in thickness.   Right ovary: Measures 14.4 x 16.0 x 19.1 cm. Cysts measuring 14.2 x 8.9 x   15.3 cm and 6.7 x 4.5 x 6.7 cm. Flow present.   Left ovary: Measures 0.9 x 0.7 x 0.7 cm. Unremarkable. Flow present.   Additional: No adnexal mass. Trace free fluid.    Impression:    Large right ovarian cystic lesion. Patient reported pain during the examination.   Clinical correlation is advised to assess for ovariantorsion as focal   lesions may serve as a lead point, given the size. Neoplasm cannot be   excluded in a postmenopausal patient and follow-up (MRI) is also   recommended for further evaluation. HD#2    Patient seen and examined at bedside, no acute overnight events. Pt c/o pain which is well controlled overnight with Morphine @ 10pm and Toradol @ 5am. NPO overnight.     Patient is ambulating, passing flatus, voiding spontaneously with frequency. Denies CP, SOB, N/V, fevers, and chills.    Vital Signs Last 24 Hours  T(C): 36.5, Max: 37.2 (06-11 @ 17:05)  HR: 80 (80 - 110)  BP: 147/81 (127/84 - 147/81)  RR: 16 (16 - 17)  SpO2: 95% (94% - 97%)    I&O's Summary    I & Os for current day (as of 12 Jun 2017 07:07)  =============================================  IN: 1990 ml / OUT: 0 ml / NET: 1990 ml    Physical Exam:  General: NAD  CV: NR, RR, S1, S2, no M/R/G  Lungs: CTA-B  Abdomen: nondistended, Soft, +BS, mild tenderness in RLQ/lower abdomen   Ext: No pain or swelling    Labs:                        15.7   9.3   )-----------( 176      ( 11 Jun 2017 18:41 )             45.9   baso 0.2    eos 0.5    imm gran x      lymph 24.8   mono 6.2    poly 68.3     06-11    140  |  101  |  11  ----------------------------<  122<H>  4.2   |  26  |  0.73    Ca    9.6      11 Jun 2017 18:41    TPro  7.5  /  Alb  3.8  /  TBili  0.3  /  DBili  x   /  AST  21  /  ALT  19  /  AlkPhos  64  06-11    PT/INR - ( 11 Jun 2017 18:41 )   PT: 11.9 sec;   INR: 1.10 ratio       PTT - ( 11 Jun 2017 18:41 )  PTT:29.0 sec    Blood Type: O Positive    Ca125: 16  CEA: 3      MEDICATIONS  (STANDING):  nicotine - 21 mG/24Hr(s) Patch 1patch Transdermal daily  levothyroxine 100MICROGram(s) Oral daily  lactated ringers. 1000milliLiter(s) IV Continuous <Continuous>    MEDICATIONS  (PRN):  ketorolac   Injectable 30milliGRAM(s) IV Push every 6 hours PRN Mild Pain (1 - 3)    CTAP:     FINDINGS:    LOWER CHEST: Subsegmental atelectasis. Paraseptal/centrilobular emphysema.    LIVER: A 1.1 x 1.0 cm left hepatic cyst.  GALLBLADDER/BILE DUCTS: No intrahepatic or extrahepatic biliary   dilatation. Cholelithiasis.   PANCREAS: Unremarkable.  SPLEEN: Unremarkable.    ADRENALS: Unremarkable.  KIDNEYS/URETERS: No hydronephrosis, hydroureter or perinephric stranding.   Subcentimeter hypodense focus in the left lower renal pole, too small to   characterize.   BLADDER: Unremarkable.    REPRODUCTIVE ORGANS: Again noted large cystic lesion in the right adnexa   measuring up to 15.2 x 14.7 x 15.5 cm. Unremarkable uterus and left   adnexa.    BOWEL: No bowel obstruction. Unremarkable appendix. No significant bowel   wall thickening or inflammatory change.  Colon diverticulosis.  PERITONEUM: No drainable fluid collection or free air. Trace free fluid   in the right lower quadrant and pelvis.  VESSELS: Atherosclerotic change of the abdominal aorta and its branches.   RETROPERITONEUM: No lymphadenopathy.    ABDOMINAL WALL/SOFT TISSUES: Small fat-containing umbilical hernia.  BONES: Degenerative changes of the spine.     IMPRESSION:     Large right adnexal cystic lesion. Clinical relation is advised to assess  ovarian torsion as focal lesion may serve as a lead point (given the   size). Neoplasm cannot be excluded in a postmenopausal patient and   follow-up pelvic MRI is recommended for further evaluation.      TVUS:    Findings:     Uterus: Measures 6.2 x 2.3 x 3.3 cm. Unremarkable.  Endometrium: Measures 0.2 cm in thickness.   Right ovary: Measures 14.4 x 16.0 x 19.1 cm. Cysts measuring 14.2 x 8.9 x   15.3 cm and 6.7 x 4.5 x 6.7 cm. Flow present.   Left ovary: Measures 0.9 x 0.7 x 0.7 cm. Unremarkable. Flow present.   Additional: No adnexal mass. Trace free fluid.    Impression:    Large right ovarian cystic lesion. Patient reported pain during the examination.   Clinical correlation is advised to assess for ovariantorsion as focal   lesions may serve as a lead point, given the size. Neoplasm cannot be   excluded in a postmenopausal patient and follow-up (MRI) is also   recommended for further evaluation.

## 2017-06-12 NOTE — PROGRESS NOTE ADULT - PROBLEM SELECTOR PLAN 1
Neuro: c/w IV analgesia   CV: Hemodynamically stable  Pulm: Saturating well on room air, IS as Pt is current 60-pack year smoker. Continue Nicotine patch   GI: Reg diet, as medicine/oncology workup is needed prior to OR  : voiding spontaneously  Heme: HSQ and SCDs for DVT ppx  Dispo: Medicine consult for OR clearance, GYN Oncology consult for evaluation

## 2017-06-12 NOTE — CONSULT NOTE ADULT - PROBLEM SELECTOR RECOMMENDATION 9
-surgery likely tomorrow per gyn, awaiting gyn onc eval  -malignancy w/u per gyn onc  -pain control per gyn

## 2017-06-12 NOTE — CONSULT NOTE ADULT - SUBJECTIVE AND OBJECTIVE BOX
Patient is a 56y old  Female who presents with a chief complaint of RLQ pain, vaginal spotting, found to have 15cm R adnexal mass. Pt initially presented to [facility] for RLQ pain and post menopausal vaginal bleeding. Pt was initially scheduled for surgery but then there was a scheduling conflict so she was discharged to follow up as an outpatient       SUBJECTIVE / OVERNIGHT EVENTS:    MEDICATIONS  (STANDING):  nicotine - 21 mG/24Hr(s) Patch 1patch Transdermal daily  levothyroxine 100MICROGram(s) Oral daily  lactated ringers. 1000milliLiter(s) IV Continuous <Continuous>  heparin  Injectable 5000Unit(s) SubCutaneous every 12 hours    MEDICATIONS  (PRN):  ketorolac   Injectable 30milliGRAM(s) IV Push every 6 hours PRN Mild Pain (1 - 3)      Vital Signs Last 24 Hrs  T(C): 36.6, Max: 37.2 (06-11 @ 17:05)  T(F): 97.8, Max: 99 (06-11 @ 17:05)  HR: 94 (80 - 110)  BP: 121/74 (121/74 - 147/81)  BP(mean): --  RR: 18 (16 - 18)  SpO2: 95% (94% - 97%)  CAPILLARY BLOOD GLUCOSE    I&O's Summary    I & Os for current day (as of 12 Jun 2017 09:41)  =============================================  IN: 2605 ml / OUT: 0 ml / NET: 2605 ml      PHYSICAL EXAM:  CONSTIPATION: NAD, well-developed  HEAD:  Atraumatic, Normocephalic  EYES: EOMI, PERRLA, conjunctiva and sclera clear  ENMT: Supple, No JVD  RESPIRATORY: Clear to auscultation bilaterally; No wheeze  CARDIOVASCULAR: Regular rate and rhythm; No murmurs, rubs, or gallops  GI: Soft, Nontender, Nondistended; Bowel sounds present  EXTREMITIES:  2+ Peripheral Pulses, No clubbing, cyanosis, or edema  PSYCH: AAOx3  NEUROLOGY: non-focal  SKIN: No rashes or lesions    LABS:                        15.7   9.3   )-----------( 176      ( 11 Jun 2017 18:41 )             45.9     06-11    140  |  101  |  11  ----------------------------<  122<H>  4.2   |  26  |  0.73    Ca    9.6      11 Jun 2017 18:41    TPro  7.5  /  Alb  3.8  /  TBili  0.3  /  DBili  x   /  AST  21  /  ALT  19  /  AlkPhos  64  06-11    PT/INR - ( 11 Jun 2017 18:41 )   PT: 11.9 sec;   INR: 1.10 ratio         PTT - ( 11 Jun 2017 18:41 )  PTT:29.0 sec          RADIOLOGY & ADDITIONAL TESTS:    Imaging Personally Reviewed:    Consultant(s) Notes Reviewed:      Care Discussed with Consultants/Other Providers: 56f who presents with a chief complaint of RLQ pain, vaginal spotting, found to have 15cm R adnexal mass. Pt initially presented to [facility] for RLQ pain and post menopausal vaginal bleeding. Pt was initially scheduled for surgery but then there was a scheduling conflict so she was discharged to follow up as an outpatient. Pt could not go to her appointment because of insurance issues and pain became worse so she presented to [facility] 6/11 for worsening pain. Pt notes pain is a bit improved now with pain control. Denies cp/sob/n/v/f/c/decreased appetite/diarrhea/unintentional weight loss. Is a self proclaimed "gym rat", enjoys swimming, chris etc. Able to climb multiple flights of stairs without difficulty. Current smoker, is trying to quit, down to 1 ppd from 2ppd. Denies PND/orthopnea/wheezing/cough production      SUBJECTIVE / OVERNIGHT EVENTS:    MEDICATIONS  (STANDING):  nicotine - 21 mG/24Hr(s) Patch 1patch Transdermal daily  levothyroxine 100MICROGram(s) Oral daily  lactated ringers. 1000milliLiter(s) IV Continuous <Continuous>  heparin  Injectable 5000Unit(s) SubCutaneous every 12 hours    MEDICATIONS  (PRN):  ketorolac   Injectable 30milliGRAM(s) IV Push every 6 hours PRN Mild Pain (1 - 3)      Vital Signs Last 24 Hrs  T(C): 36.6, Max: 37.2 (06-11 @ 17:05)  T(F): 97.8, Max: 99 (06-11 @ 17:05)  HR: 94 (80 - 110)  BP: 121/74 (121/74 - 147/81)  BP(mean): --  RR: 18 (16 - 18)  SpO2: 95% (94% - 97%)  CAPILLARY BLOOD GLUCOSE    I&O's Summary    I & Os for current day (as of 12 Jun 2017 09:41)  =============================================  IN: 2605 ml / OUT: 0 ml / NET: 2605 ml      PHYSICAL EXAM:  CONSTIPATION: NAD, well-developed  HEAD:  Atraumatic, Normocephalic  EYES: EOMI, PERRLA, conjunctiva and sclera clear  ENMT: Supple, No JVD  RESPIRATORY: Clear to auscultation bilaterally; No wheeze  CARDIOVASCULAR: Regular rate and rhythm; No murmurs, rubs, or gallops  GI: Soft, Nontender, Nondistended; Bowel sounds present  EXTREMITIES:  2+ Peripheral Pulses, No clubbing, cyanosis, or edema  PSYCH: AAOx3  NEUROLOGY: non-focal  SKIN: No rashes or lesions    LABS:                        15.7   9.3   )-----------( 176      ( 11 Jun 2017 18:41 )             45.9     06-11    140  |  101  |  11  ----------------------------<  122<H>  4.2   |  26  |  0.73    Ca    9.6      11 Jun 2017 18:41    TPro  7.5  /  Alb  3.8  /  TBili  0.3  /  DBili  x   /  AST  21  /  ALT  19  /  AlkPhos  64  06-11    PT/INR - ( 11 Jun 2017 18:41 )   PT: 11.9 sec;   INR: 1.10 ratio         PTT - ( 11 Jun 2017 18:41 )  PTT:29.0 sec          RADIOLOGY & ADDITIONAL TESTS:    Imaging Personally Reviewed:    Consultant(s) Notes Reviewed:      Care Discussed with Consultants/Other Providers: 56f graves s/p GRIMALDO now on synthroid who presents with a chief complaint of RLQ pain, vaginal spotting, found to have 15cm R adnexal mass. Pt initially presented to [facility] for RLQ pain and post menopausal vaginal bleeding. Pt was initially scheduled for surgery but then there was a scheduling conflict so she was discharged to follow up as an outpatient. Pt could not go to her appointment because of insurance issues and pain became worse so she presented to [facility] 6/11 for worsening pain. Pt notes pain is a bit improved now with pain control. Denies cp/sob/n/v/f/c/decreased appetite/diarrhea/unintentional weight loss. Is a self proclaimed "gym rat", enjoys swimming, chris etc. Able to climb multiple flights of stairs without difficulty. Current smoker, is trying to quit, down to 1 ppd from 2ppd. Denies PND/orthopnea/wheezing/cough production      SUBJECTIVE / OVERNIGHT EVENTS:    MEDICATIONS  (STANDING):  nicotine - 21 mG/24Hr(s) Patch 1patch Transdermal daily  levothyroxine 100MICROGram(s) Oral daily  lactated ringers. 1000milliLiter(s) IV Continuous <Continuous>  heparin  Injectable 5000Unit(s) SubCutaneous every 12 hours    MEDICATIONS  (PRN):  ketorolac   Injectable 30milliGRAM(s) IV Push every 6 hours PRN Mild Pain (1 - 3)      Vital Signs Last 24 Hrs  T(C): 36.6, Max: 37.2 (06-11 @ 17:05)  T(F): 97.8, Max: 99 (06-11 @ 17:05)  HR: 94 (80 - 110)  BP: 121/74 (121/74 - 147/81)  BP(mean): --  RR: 18 (16 - 18)  SpO2: 95% (94% - 97%)  CAPILLARY BLOOD GLUCOSE    I&O's Summary    I & Os for current day (as of 12 Jun 2017 09:41)  =============================================  IN: 2605 ml / OUT: 0 ml / NET: 2605 ml      PHYSICAL EXAM:  CONSTIPATION: NAD, well-developed  HEAD:  Atraumatic, Normocephalic  EYES: EOMI, PERRLA, conjunctiva and sclera clear  ENMT: Supple, No JVD  RESPIRATORY: Clear to auscultation bilaterally; No wheeze  CARDIOVASCULAR: Regular rate and rhythm; No murmurs, rubs, or gallops  GI: Soft, ttp RLQ Nondistended; Bowel sounds present  EXTREMITIES:  2+ Peripheral Pulses, No clubbing, cyanosis, or edema  PSYCH: AAOx3  NEUROLOGY: non-focal  SKIN: No rashes or lesions    LABS:                        15.7   9.3   )-----------( 176      ( 11 Jun 2017 18:41 )             45.9     06-11    140  |  101  |  11  ----------------------------<  122<H>  4.2   |  26  |  0.73    Ca    9.6      11 Jun 2017 18:41    TPro  7.5  /  Alb  3.8  /  TBili  0.3  /  DBili  x   /  AST  21  /  ALT  19  /  AlkPhos  64  06-11    PT/INR - ( 11 Jun 2017 18:41 )   PT: 11.9 sec;   INR: 1.10 ratio         PTT - ( 11 Jun 2017 18:41 )  PTT:29.0 sec          RADIOLOGY & ADDITIONAL TESTS:    Imaging Personally Reviewed: CT a/p paraseptal/centrilobular emphysema, large R adenxal cystic lesion. EKG sinus hr 84 qtc 453, lvh     Consultant(s) Notes Reviewed:      Care Discussed with Consultants/Other Providers: Gyn

## 2017-06-12 NOTE — CONSULT NOTE ADULT - PROBLEM SELECTOR RECOMMENDATION 9
1. Plan for surgical management of R adnexal mass. Given size of mass, will proceed with vertical skin incision. Pt added to OR schedule for ex-lap, BSO. Tumor markers wnl.  2. NPO after midnight, LR @125  3. F/u EMB and PAP (6/11) which are rushed  4. Request gyn-onc for back up due to patients age and size of mass.

## 2017-06-12 NOTE — CONSULT NOTE ADULT - SUBJECTIVE AND OBJECTIVE BOX
57 y/o GYNECOLOGIC ONCOLOGY CONSULT NOTE    56y   Last Menstrual Period  presents with complaints of sudden onset RLQ pain since 17. Pt presented to [facility] on  due to uncontrolled pain. Pt had a TVUS and CTAP which showed a 15cm R ovarian mass. Pt was originally booked for surgery  but it had to be cancelled due to no gyn-onc being available at [facility]. Pt came to [facility]  due to persistent pain not relieved with percocet. Pain was sharp and worsened with movement. Pt denies cp, sob, n/v, fevers, chills, changes in bowel habits, changes in urinary habits or any recent weight loss. Of note, pt noticed that she had vaginal bleeding on , which she noticed when she wiped.    OB/GYN HISTORY: c/s x1    Past Medical History:   Hypothyroid, grave's disease s/p radioiodine ablation ()    Surgical History:    Delivered by  section    No Known Allergies    Colonoscopy: never  mammogram: ; wnl as per pt  Pap; : wnl as per pt    nicotine - 21 mG/24Hr(s) Patch 1patch Transdermal daily  levothyroxine 100MICROGram(s) Oral daily  ketorolac   Injectable 30milliGRAM(s) IV Push every 6 hours PRN  heparin  Injectable 5000Unit(s) SubCutaneous every 12 hours  morphine  - Injectable 4milliGRAM(s) IV Push every 4 hours PRN  lactated ringers. 1000milliLiter(s) IV Continuous <Continuous>      FAMILY HISTORY:  No pertinent family history in first degree relatives      Social History: 2pack a day smoking history x10 years, currently trying to quit and on nicotine patch, social drinker, no drug use    REVIEW OF SYSTEMS:     CONSTITUTIONAL: No fever, weight loss, or fatigue  NECK: No pain or stiffness  BREASTS: No pain, masses, or nipple discharge  RESPIRATORY: No cough, wheezing, chills or hemoptysis; No shortness of breath  CARDIOVASCULAR: No chest pain, palpitations, dizziness, or leg swelling  GASTROINTESTINAL: + abdominal pain, worsened with movement. No nausea, vomiting, or hematemesis; No diarrhea or constipation. No melena or hematochezia.  GENITOURINARY: No dysuria, frequency, hematuria, or incontinence  NEUROLOGICAL: No headaches, memory loss, loss of strength, numbness, or tremors  SKIN: No itching, burning, rashes, or lesions   MUSCULOSKELETAL: No joint pain or swelling; No muscle, back, or extremity pain  PSYCHIATRIC: No depression, anxiety, mood swings, or difficulty sleeping  HEME/LYMPH: No easy bruising, or bleeding gums  ALLERY AND IMMUNOLOGIC: No hives or eczema      MEDICATIONS  (STANDING):  nicotine - 21 mG/24Hr(s) Patch 1patch Transdermal daily  levothyroxine 100MICROGram(s) Oral daily  heparin  Injectable 5000Unit(s) SubCutaneous every 12 hours  lactated ringers. 1000milliLiter(s) IV Continuous <Continuous>    MEDICATIONS  (PRN):  ketorolac   Injectable 30milliGRAM(s) IV Push every 6 hours PRN Mild Pain (1 - 3)  morphine  - Injectable 4milliGRAM(s) IV Push every 4 hours PRN Severe Pain (7 - 10)      OBJECTIVE FINDINGS:    Vital Signs Last 24 Hrs  T(C): 36.8, Max: 36.8 ( @ 22:05)  T(F): 98.2, Max: 98.2 ( @ 22:05)  HR: 76 (74 - 102)  BP: 166/96 (121/74 - 166/96)  BP(mean): --  RR: 18 (16 - 18)  SpO2: 94% (94% - 95%)    PHYSICAL EXAM:    GENERAL: NAD, well-developed  HEAD:  Atraumatic, Normocephalic  NECK: Supple, No JVD  CHEST/LUNG: CTABL  HEART: Regular rate and rhythm; No murmurs, rubs, or gallops  ABDOMEN: Soft, + RLQ tenderness. No rebound. No Guarding. + BS  EXTREMITIES:  2+ Peripheral Pulses, No clubbing, cyanosis, or edema  LYMPH: No lymphadenopathy noted  SKIN: No rashes or lesions  PELVIC: deferred      LABS:                        15.7   9.3   )-----------( 176      ( 2017 18:41 )             45.9     -    140  |  101  |  11  ----------------------------<  122<H>  4.2   |  26  |  0.73    Ca    9.6      2017 18:41    TPro  7.5  /  Alb  3.8  /  TBili  0.3  /  DBili  x   /  AST  21  /  ALT  19  /  AlkPhos  64  06-11    PT/INR - ( 2017 18:41 )   PT: 11.9 sec;   INR: 1.10 ratio         PTT - ( 2017 18:41 )  PTT:29.0 sec      Human Chorionic Gonadotropin - TM (17 @ 07:26)    Human Chorionic Gonadotropin - TM: 3 mIU/mL    Cancer Antigen, GI Ca 19-9 (17 @ 07:24)    Cancer Antigen, GI Ca 19-9: 12.2: METHOD: Shape[employer] Chemiluminescent Immunoassay  Lactate Dehydrogenase, Serum (17 @ 05:24)    Lactate Dehydrogenase, Serum: 150 U/L    Cancer Antigen, 125 (17 @ 10:29)    Cancer Antigen, 125: 16: Method: Abbott CMIA    Carcinoembryonic Antigen: 3.0: METHOD: Roche EIA   The CEA assay should not be used as a cancer screening test. Serum CEA  concentrations should only be used in conjunction with   information available from the clinical evaluation of the patien and  from other diagnostic procedures3.0: .   CEA Normal Ranges   _________________   Non-smoker: less than 3.9 ng/mL       Smoker: less than 5.5 ng/mL ng/mL (17 @ 10:29)      RADIOLOGY & ADDITIONAL STUDIES:  CTAP (): Again noted large cystic lesion in the right adnexa   measuring up to 15.2 x 14.7 x 15.5 cm. Unremarkable uterus and left   adnexa.  TvuS (): Uterus: Measures 6.2 x 2.3 x 3.3 cm. Unremarkable.  Endometrium: Measures 0.2 cm in thickness.   Right ovary: Measures 14.4 x 16.0 x 19.1 cm. Cysts measuring 14.2 x 8.9 x   15.3 cm and 6.7 x 4.5 x 6.7 cm. Flow present.   Left ovary: Measures 0.9 x 0.7 x 0.7 cm. Unremarkable. Flow present.   Additional: No adnexal mass. Trace free fluid.

## 2017-06-12 NOTE — CONSULT NOTE ADULT - PROBLEM SELECTOR RECOMMENDATION 3
-ideally laparascopic surgery per gyn but may need to convert to open pending intraop findings   -no further w/u needed prior to surgery  -currently pt with 0 of 6 risk factors, roughly 0.4% risk of major cardiac event post op. If the surgery becomes intraperitoneal, risk would be roughly 1%

## 2017-06-12 NOTE — PROGRESS NOTE ADULT - ATTENDING COMMENTS
Agree with above assessment and plan. Pt with large pelvic mass nml ca125. pt requires BSO/D+C, will obtain gyn onc consult for poss standby and medical clearance. r/b/a of conservative vs surgical mgmt discussed at length. Considering size and complexity of ovarian mass would recommend surgical resection. All questions answered. pt verbalized full understanding.

## 2017-06-13 DIAGNOSIS — F17.210 NICOTINE DEPENDENCE, CIGARETTES, UNCOMPLICATED: ICD-10-CM

## 2017-06-13 DIAGNOSIS — D72.829 ELEVATED WHITE BLOOD CELL COUNT, UNSPECIFIED: ICD-10-CM

## 2017-06-13 DIAGNOSIS — N83.201 UNSPECIFIED OVARIAN CYST, RIGHT SIDE: ICD-10-CM

## 2017-06-13 DIAGNOSIS — R10.31 RIGHT LOWER QUADRANT PAIN: ICD-10-CM

## 2017-06-13 DIAGNOSIS — Z98.890 OTHER SPECIFIED POSTPROCEDURAL STATES: ICD-10-CM

## 2017-06-13 DIAGNOSIS — E03.9 HYPOTHYROIDISM, UNSPECIFIED: ICD-10-CM

## 2017-06-13 DIAGNOSIS — I15.8 OTHER SECONDARY HYPERTENSION: ICD-10-CM

## 2017-06-13 LAB
HCT VFR BLD CALC: 41.6 % — SIGNIFICANT CHANGE UP (ref 34.5–45)
HGB BLD-MCNC: 13.8 G/DL — SIGNIFICANT CHANGE UP (ref 11.5–15.5)
HPV HIGH+LOW RISK DNA PNL CVX: SIGNIFICANT CHANGE UP
MCHC RBC-ENTMCNC: 32.3 PG — SIGNIFICANT CHANGE UP (ref 27–34)
MCHC RBC-ENTMCNC: 33.3 GM/DL — SIGNIFICANT CHANGE UP (ref 32–36)
MCV RBC AUTO: 97 FL — SIGNIFICANT CHANGE UP (ref 80–100)
PLATELET # BLD AUTO: 160 K/UL — SIGNIFICANT CHANGE UP (ref 150–400)
RBC # BLD: 4.29 M/UL — SIGNIFICANT CHANGE UP (ref 3.8–5.2)
RBC # FLD: 12.7 % — SIGNIFICANT CHANGE UP (ref 10.3–14.5)
SURGICAL PATHOLOGY STUDY: SIGNIFICANT CHANGE UP
WBC # BLD: 8.1 K/UL — SIGNIFICANT CHANGE UP (ref 3.8–10.5)
WBC # FLD AUTO: 8.1 K/UL — SIGNIFICANT CHANGE UP (ref 3.8–10.5)

## 2017-06-13 PROCEDURE — 88112 CYTOPATH CELL ENHANCE TECH: CPT | Mod: 26

## 2017-06-13 PROCEDURE — 88331 PATH CONSLTJ SURG 1 BLK 1SPC: CPT | Mod: 26

## 2017-06-13 PROCEDURE — 88305 TISSUE EXAM BY PATHOLOGIST: CPT | Mod: 26

## 2017-06-13 PROCEDURE — 99233 SBSQ HOSP IP/OBS HIGH 50: CPT

## 2017-06-13 PROCEDURE — 88307 TISSUE EXAM BY PATHOLOGIST: CPT | Mod: 26

## 2017-06-13 RX ORDER — NALOXONE HYDROCHLORIDE 4 MG/.1ML
0.1 SPRAY NASAL
Qty: 0 | Refills: 0 | Status: DISCONTINUED | OUTPATIENT
Start: 2017-06-13 | End: 2017-06-15

## 2017-06-13 RX ORDER — ONDANSETRON 8 MG/1
4 TABLET, FILM COATED ORAL ONCE
Qty: 0 | Refills: 0 | Status: DISCONTINUED | OUTPATIENT
Start: 2017-06-13 | End: 2017-06-13

## 2017-06-13 RX ORDER — SIMETHICONE 80 MG/1
80 TABLET, CHEWABLE ORAL THREE TIMES A DAY
Qty: 0 | Refills: 0 | Status: DISCONTINUED | OUTPATIENT
Start: 2017-06-13 | End: 2017-06-19

## 2017-06-13 RX ORDER — HYDROMORPHONE HYDROCHLORIDE 2 MG/ML
0.5 INJECTION INTRAMUSCULAR; INTRAVENOUS; SUBCUTANEOUS
Qty: 0 | Refills: 0 | Status: DISCONTINUED | OUTPATIENT
Start: 2017-06-13 | End: 2017-06-15

## 2017-06-13 RX ORDER — HYDROMORPHONE HYDROCHLORIDE 2 MG/ML
0.5 INJECTION INTRAMUSCULAR; INTRAVENOUS; SUBCUTANEOUS
Qty: 0 | Refills: 0 | Status: DISCONTINUED | OUTPATIENT
Start: 2017-06-13 | End: 2017-06-13

## 2017-06-13 RX ORDER — ONDANSETRON 8 MG/1
4 TABLET, FILM COATED ORAL EVERY 6 HOURS
Qty: 0 | Refills: 0 | Status: DISCONTINUED | OUTPATIENT
Start: 2017-06-13 | End: 2017-06-15

## 2017-06-13 RX ORDER — HEPARIN SODIUM 5000 [USP'U]/ML
5000 INJECTION INTRAVENOUS; SUBCUTANEOUS EVERY 12 HOURS
Qty: 0 | Refills: 0 | Status: DISCONTINUED | OUTPATIENT
Start: 2017-06-13 | End: 2017-06-16

## 2017-06-13 RX ORDER — HYDROMORPHONE HYDROCHLORIDE 2 MG/ML
30 INJECTION INTRAMUSCULAR; INTRAVENOUS; SUBCUTANEOUS
Qty: 0 | Refills: 0 | Status: DISCONTINUED | OUTPATIENT
Start: 2017-06-13 | End: 2017-06-15

## 2017-06-13 RX ORDER — HYDRALAZINE HCL 50 MG
10 TABLET ORAL ONCE
Qty: 0 | Refills: 0 | Status: COMPLETED | OUTPATIENT
Start: 2017-06-13 | End: 2017-06-13

## 2017-06-13 RX ORDER — DOCUSATE SODIUM 100 MG
100 CAPSULE ORAL THREE TIMES A DAY
Qty: 0 | Refills: 0 | Status: DISCONTINUED | OUTPATIENT
Start: 2017-06-13 | End: 2017-06-19

## 2017-06-13 RX ORDER — LEVOTHYROXINE SODIUM 125 MCG
100 TABLET ORAL DAILY
Qty: 0 | Refills: 0 | Status: DISCONTINUED | OUTPATIENT
Start: 2017-06-13 | End: 2017-06-19

## 2017-06-13 RX ORDER — SODIUM CHLORIDE 9 MG/ML
1000 INJECTION, SOLUTION INTRAVENOUS
Qty: 0 | Refills: 0 | Status: DISCONTINUED | OUTPATIENT
Start: 2017-06-13 | End: 2017-06-15

## 2017-06-13 RX ADMIN — SIMETHICONE 80 MILLIGRAM(S): 80 TABLET, CHEWABLE ORAL at 23:22

## 2017-06-13 RX ADMIN — MORPHINE SULFATE 4 MILLIGRAM(S): 50 CAPSULE, EXTENDED RELEASE ORAL at 11:42

## 2017-06-13 RX ADMIN — MORPHINE SULFATE 4 MILLIGRAM(S): 50 CAPSULE, EXTENDED RELEASE ORAL at 11:12

## 2017-06-13 RX ADMIN — HEPARIN SODIUM 5000 UNIT(S): 5000 INJECTION INTRAVENOUS; SUBCUTANEOUS at 05:29

## 2017-06-13 RX ADMIN — SODIUM CHLORIDE 125 MILLILITER(S): 9 INJECTION, SOLUTION INTRAVENOUS at 11:12

## 2017-06-13 RX ADMIN — MORPHINE SULFATE 4 MILLIGRAM(S): 50 CAPSULE, EXTENDED RELEASE ORAL at 05:33

## 2017-06-13 RX ADMIN — Medication 100 MICROGRAM(S): at 23:22

## 2017-06-13 RX ADMIN — HYDROMORPHONE HYDROCHLORIDE 0.5 MILLIGRAM(S): 2 INJECTION INTRAMUSCULAR; INTRAVENOUS; SUBCUTANEOUS at 17:30

## 2017-06-13 RX ADMIN — HYDROMORPHONE HYDROCHLORIDE 0.5 MILLIGRAM(S): 2 INJECTION INTRAMUSCULAR; INTRAVENOUS; SUBCUTANEOUS at 18:30

## 2017-06-13 RX ADMIN — HYDROMORPHONE HYDROCHLORIDE 30 MILLILITER(S): 2 INJECTION INTRAMUSCULAR; INTRAVENOUS; SUBCUTANEOUS at 19:36

## 2017-06-13 RX ADMIN — SODIUM CHLORIDE 125 MILLILITER(S): 9 INJECTION, SOLUTION INTRAVENOUS at 17:44

## 2017-06-13 RX ADMIN — HEPARIN SODIUM 5000 UNIT(S): 5000 INJECTION INTRAVENOUS; SUBCUTANEOUS at 23:22

## 2017-06-13 RX ADMIN — Medication 10 MILLIGRAM(S): at 12:58

## 2017-06-13 RX ADMIN — HYDROMORPHONE HYDROCHLORIDE 30 MILLILITER(S): 2 INJECTION INTRAMUSCULAR; INTRAVENOUS; SUBCUTANEOUS at 17:43

## 2017-06-13 RX ADMIN — Medication 1 PATCH: at 12:08

## 2017-06-13 RX ADMIN — MORPHINE SULFATE 4 MILLIGRAM(S): 50 CAPSULE, EXTENDED RELEASE ORAL at 06:05

## 2017-06-13 RX ADMIN — MORPHINE SULFATE 4 MILLIGRAM(S): 50 CAPSULE, EXTENDED RELEASE ORAL at 00:30

## 2017-06-13 RX ADMIN — HYDROMORPHONE HYDROCHLORIDE 0.5 MILLIGRAM(S): 2 INJECTION INTRAMUSCULAR; INTRAVENOUS; SUBCUTANEOUS at 17:45

## 2017-06-13 NOTE — PROGRESS NOTE ADULT - PROBLEM SELECTOR PLAN 1
Neuro: c/w IV analgesia as Pt NPO  CV: Hemodynamically stable, will rediscuss with Medicine team regarding hypertension. Appreciate medicine recs  Pulm: Saturating well on room air, IS as Pt is current 60-pack year smoker. Continue Nicotine patch   GI: NPO for OR today  : voiding spontaneously  Heme: HSQ and SCDs for DVT ppx  Dispo: for OR today

## 2017-06-13 NOTE — BRIEF OPERATIVE NOTE - PROCEDURE
Dilation and curettage  06/13/2017    Active  TONYAROTCARLOS ENRIQUE  Bilateral salpingo-oophorectomy  06/13/2017    Active  TONYAROTCARLOS ENRIQUE  Exploratory laparotomy  06/13/2017    Active  BELLO

## 2017-06-13 NOTE — BRIEF OPERATIVE NOTE - OPERATION/FINDINGS
D&C performed and sent to frozen; benign. Large 15cm right ovarian mass removed intact from abdomen, sent to frozen: benign. Small uterus with small perforation noted after D&C, repaired with vicryl suture. grossly normal fallopian tubes and left ovary.

## 2017-06-13 NOTE — PROGRESS NOTE ADULT - SUBJECTIVE AND OBJECTIVE BOX
Patient seen and examined at bedside, no acute overnight events. No acute complaints, pain well controlled.  Patient is ambulating and tolerating clears. Has not yet passed flatus. Quiros is still in place. Denies CP, SOB, N/V, fevers, and chills.    Vital Signs Last 24 Hours  T(C): 36.6, Max: 36.9 (06-12 @ 21:06)  HR: 95 (80 - 106)  BP: 94/60 (88/56 - 172/96)  RR: 17 (16 - 20)  SpO2: 92% (92% - 98%)  Wt(kg): --    I&O's Summary  I & Os for 24h ending 13 Jun 2017 07:00  =============================================  IN: 2540 ml / OUT: 0 ml / NET: 2540 ml    I & Os for current day (as of 13 Jun 2017 21:03)  =============================================  IN: 1125 ml / OUT: 115 ml / NET: 1010 ml      Physical Exam:  General: NAD  CV: NR, RR, S1, S2, no M/R/G  Lungs: CTA-B  Abdomen: Soft, non-tender, non-distended, +BS  Incision: _ CDI  Ext: No pain or swelling    Labs:             13.8   8.1   )-----------( 160      ( 06-13 @ 06:54 )             41.6                15.7   9.3   )-----------( 176      ( 06-11 @ 18:41 )             45.9         MEDICATIONS  (STANDING):  HYDROmorphone PCA (1 mG/mL) 30milliLiter(s) PCA Continuous PCA Continuous  heparin  Injectable 5000Unit(s) SubCutaneous every 12 hours  lactated ringers. 1000milliLiter(s) IV Continuous <Continuous>  simethicone 80milliGRAM(s) Chew three times a day    MEDICATIONS  (PRN):  HYDROmorphone PCA (1 mG/mL) Rescue Clinician Bolus 0.5milliGRAM(s) IV Push every 15 minutes PRN for Pain Scale GREATER THAN 6  naloxone Injectable 0.1milliGRAM(s) IV Push every 3 minutes PRN For ANY of the following changes in patient status:  A. RR LESS THAN 10 breaths per minute, B. Oxygen saturation LESS THAN 90%, C. Sedation score of 6  ondansetron Injectable 4milliGRAM(s) IV Push every 6 hours PRN Nausea  docusate sodium 100milliGRAM(s) Oral three times a day PRN Stool Softening

## 2017-06-13 NOTE — PROGRESS NOTE ADULT - PROBLEM SELECTOR PLAN 1
multifactorial, in setting of uncontrolled pain, anxiety of upcoming surgery.  -no history of HTN  -would control pain, consider anxiolytic (xanax 0.25-0.5mg).  -would avoid beta blocker or ACE-i in nadeen-op period.    -can use hydralazine 10mg IV.

## 2017-06-13 NOTE — PROGRESS NOTE ADULT - SUBJECTIVE AND OBJECTIVE BOX
Patient seen and examined at bedside for postop check.No acute complaints, pain well controlled.  Patient is not ambulatory. Pt is tolerating clears. Has not yet passed flatus. Quiros is still in place. Denies CP, SOB, N/V, fevers, and chills.    Vital Signs Last 24 Hours  T(C): 36.7, Max: 36.9 (06-13 @ 01:04)  HR: 98 (80 - 106)  BP: 101/67 (88/56 - 172/96)  RR: 18 (16 - 20)  SpO2: 92% (92% - 98%)  Wt(kg): --    I&O's Summary  I & Os for 24h ending 13 Jun 2017 07:00  =============================================  IN: 2540 ml / OUT: 0 ml / NET: 2540 ml    I & Os for current day (as of 13 Jun 2017 23:12)  =============================================  IN: 1305 ml / OUT: 240 ml / NET: 1065 ml      Physical Exam:  General: NAD  CV: NR, RR, S1, S2, no M/R/G  Lungs: CTA-B  Abdomen: Soft, non-tender, non-distended, +BS  Incision: vertical incision, dressing clean and dry; abdominal binder in place  Ext: No pain or swelling    Labs:             13.8   8.1   )-----------( 160      ( 06-13 @ 06:54 )             41.6                15.7   9.3   )-----------( 176      ( 06-11 @ 18:41 )             45.9         MEDICATIONS  (STANDING):  HYDROmorphone PCA (1 mG/mL) 30milliLiter(s) PCA Continuous PCA Continuous  heparin  Injectable 5000Unit(s) SubCutaneous every 12 hours  lactated ringers. 1000milliLiter(s) IV Continuous <Continuous>  simethicone 80milliGRAM(s) Chew three times a day  levothyroxine 100MICROGram(s) Oral daily    MEDICATIONS  (PRN):  HYDROmorphone PCA (1 mG/mL) Rescue Clinician Bolus 0.5milliGRAM(s) IV Push every 15 minutes PRN for Pain Scale GREATER THAN 6  naloxone Injectable 0.1milliGRAM(s) IV Push every 3 minutes PRN For ANY of the following changes in patient status:  A. RR LESS THAN 10 breaths per minute, B. Oxygen saturation LESS THAN 90%, C. Sedation score of 6  ondansetron Injectable 4milliGRAM(s) IV Push every 6 hours PRN Nausea  docusate sodium 100milliGRAM(s) Oral three times a day PRN Stool Softening

## 2017-06-13 NOTE — PROGRESS NOTE ADULT - SUBJECTIVE AND OBJECTIVE BOX
MEREDITH VASQUEZ  56y  Female      Patient is a 56y old  Female who presents with a chief complaint of RLQ pain, vaginal spotting, found to have 15cm R adnexal mass (11 Jun 2017 21:07)      INTERVAL HPI/OVERNIGHT EVENTS:  Patient hypertensive overnight.  States still having significant RLQ pain, moderately controlled with morphine.  Admits to slight headache, stable.  Patient states did not sleep well o/n as noisy roomate.  States extremely nervous about surgery today.        REVIEW OF SYSTEMS:  CONSTITUTIONAL: No fever, chills  EYES: No eye pain, visual disturbances, or discharge  stiffness  RESPIRATORY: No cough, wheezing No shortness of breath  CARDIOVASCULAR: No chest pain, palpitations  GASTROINTESTINAL: No abdominal or epigastric pain. No nausea, vomiting, or  No diarrhea or constipation. No melena or hematochezia.  GENITOURINARY: No dysuria,       T(C): 36.6, Max: 36.9 (06-12 @ 21:06)  HR: 86 (74 - 92)  BP: 159/94 (130/79 - 172/96)  RR: 16 (16 - 18)  SpO2: 95% (94% - 95%)  Wt(kg): --Vital Signs Last 24 Hrs  T(C): 36.6, Max: 36.9 (06-12 @ 21:06)  T(F): 97.8, Max: 98.5 (06-12 @ 21:06)  HR: 86 (74 - 92)  BP: 159/94 (130/79 - 172/96)  BP(mean): --  RR: 16 (16 - 18)  SpO2: 95% (94% - 95%)    PHYSICAL EXAM:  GENERAL: NAD, well-groomed, well-developed    EYES: EOMI, conjunctiva and sclera clear  ENMT Moist mucous membranes, Good dentition, No lesions  CHEST/LUNG: Clear to percussion bilaterally; No rales, rhonchi, wheezing, or rubs  HEART: Regular rate and rhythm; No murmurs, rubs, or gallops  ABDOMEN: Soft+RLQ pain with guarding. Bowel sounds present  EXTREMITIES:  2+ Peripheral Pulses, No clubbing, cyanosis, or edema  SKIN: No rashes or lesions  NERVOUS SYSTEM: Non-focal  Psych: AAOx3  Consultant(s) Notes Reviewed:  [x ] YES  [ ] NO  Care Discussed with Consultants/Other Providers [ x] YES  [ ] NO    LABS:                        13.8   8.1   )-----------( 160      ( 13 Jun 2017 06:54 )             41.6     06-11    140  |  101  |  11  ----------------------------<  122<H>  4.2   |  26  |  0.73    Ca    9.6      11 Jun 2017 18:41    TPro  7.5  /  Alb  3.8  /  TBili  0.3  /  DBili  x   /  AST  21  /  ALT  19  /  AlkPhos  64  06-11    PT/INR - ( 11 Jun 2017 18:41 )   PT: 11.9 sec;   INR: 1.10 ratio         PTT - ( 11 Jun 2017 18:41 )  PTT:29.0 sec    CAPILLARY BLOOD GLUCOSE

## 2017-06-13 NOTE — PROGRESS NOTE ADULT - ATTENDING COMMENTS
Agree with above assessment and darlene. To or today with onc standby. pt to undergo D+C/xlap BSO frozent section poss surgical staging.

## 2017-06-14 DIAGNOSIS — Z98.890 OTHER SPECIFIED POSTPROCEDURAL STATES: ICD-10-CM

## 2017-06-14 LAB
CYTOLOGY SPEC DOC CYTO: SIGNIFICANT CHANGE UP
HCT VFR BLD CALC: 41 % — SIGNIFICANT CHANGE UP (ref 34.5–45)
HCV AB S/CO SERPL IA: 0.19 S/CO — SIGNIFICANT CHANGE UP
HCV AB SERPL-IMP: SIGNIFICANT CHANGE UP
HCV RNA SERPL NAA DL=5-ACNC: SIGNIFICANT CHANGE UP IU/ML
HCV RNA SPEC NAA+PROBE-LOG IU: SIGNIFICANT CHANGE UP LOGIU/ML
HGB BLD-MCNC: 13.5 G/DL — SIGNIFICANT CHANGE UP (ref 11.5–15.5)
HIV 1 & 2 AB SERPL IA.RAPID: SIGNIFICANT CHANGE UP
MCHC RBC-ENTMCNC: 32 PG — SIGNIFICANT CHANGE UP (ref 27–34)
MCHC RBC-ENTMCNC: 32.8 GM/DL — SIGNIFICANT CHANGE UP (ref 32–36)
MCV RBC AUTO: 97.5 FL — SIGNIFICANT CHANGE UP (ref 80–100)
PLATELET # BLD AUTO: 174 K/UL — SIGNIFICANT CHANGE UP (ref 150–400)
RBC # BLD: 4.21 M/UL — SIGNIFICANT CHANGE UP (ref 3.8–5.2)
RBC # FLD: 12.5 % — SIGNIFICANT CHANGE UP (ref 10.3–14.5)
T PALLIDUM AB TITR SER: NEGATIVE — SIGNIFICANT CHANGE UP
WBC # BLD: 9.2 K/UL — SIGNIFICANT CHANGE UP (ref 3.8–10.5)
WBC # FLD AUTO: 9.2 K/UL — SIGNIFICANT CHANGE UP (ref 3.8–10.5)

## 2017-06-14 RX ORDER — BENZOCAINE AND MENTHOL 5; 1 G/100ML; G/100ML
2 LIQUID ORAL THREE TIMES A DAY
Qty: 0 | Refills: 0 | Status: DISCONTINUED | OUTPATIENT
Start: 2017-06-14 | End: 2017-06-19

## 2017-06-14 RX ORDER — NICOTINE POLACRILEX 2 MG
1 GUM BUCCAL DAILY
Qty: 0 | Refills: 0 | Status: DISCONTINUED | OUTPATIENT
Start: 2017-06-14 | End: 2017-06-19

## 2017-06-14 RX ORDER — ACETAMINOPHEN 500 MG
1000 TABLET ORAL ONCE
Qty: 0 | Refills: 0 | Status: COMPLETED | OUTPATIENT
Start: 2017-06-14 | End: 2017-06-14

## 2017-06-14 RX ORDER — KETOROLAC TROMETHAMINE 30 MG/ML
30 SYRINGE (ML) INJECTION EVERY 6 HOURS
Qty: 0 | Refills: 0 | Status: DISCONTINUED | OUTPATIENT
Start: 2017-06-14 | End: 2017-06-15

## 2017-06-14 RX ADMIN — Medication 1 PATCH: at 12:04

## 2017-06-14 RX ADMIN — Medication 400 MILLIGRAM(S): at 08:31

## 2017-06-14 RX ADMIN — Medication 30 MILLIGRAM(S): at 12:00

## 2017-06-14 RX ADMIN — BENZOCAINE AND MENTHOL 2 LOZENGE: 5; 1 LIQUID ORAL at 21:05

## 2017-06-14 RX ADMIN — Medication 30 MILLIGRAM(S): at 12:30

## 2017-06-14 RX ADMIN — Medication 100 MICROGRAM(S): at 21:05

## 2017-06-14 RX ADMIN — Medication 1 PATCH: at 12:23

## 2017-06-14 RX ADMIN — HYDROMORPHONE HYDROCHLORIDE 0.5 MILLIGRAM(S): 2 INJECTION INTRAMUSCULAR; INTRAVENOUS; SUBCUTANEOUS at 04:21

## 2017-06-14 RX ADMIN — Medication 30 MILLIGRAM(S): at 23:52

## 2017-06-14 RX ADMIN — HEPARIN SODIUM 5000 UNIT(S): 5000 INJECTION INTRAVENOUS; SUBCUTANEOUS at 23:52

## 2017-06-14 RX ADMIN — BENZOCAINE AND MENTHOL 2 LOZENGE: 5; 1 LIQUID ORAL at 13:57

## 2017-06-14 RX ADMIN — Medication 1000 MILLIGRAM(S): at 09:01

## 2017-06-14 RX ADMIN — SIMETHICONE 80 MILLIGRAM(S): 80 TABLET, CHEWABLE ORAL at 21:05

## 2017-06-14 RX ADMIN — SIMETHICONE 80 MILLIGRAM(S): 80 TABLET, CHEWABLE ORAL at 13:54

## 2017-06-14 RX ADMIN — HYDROMORPHONE HYDROCHLORIDE 30 MILLILITER(S): 2 INJECTION INTRAMUSCULAR; INTRAVENOUS; SUBCUTANEOUS at 19:29

## 2017-06-14 RX ADMIN — Medication 30 MILLIGRAM(S): at 17:18

## 2017-06-14 RX ADMIN — HYDROMORPHONE HYDROCHLORIDE 30 MILLILITER(S): 2 INJECTION INTRAMUSCULAR; INTRAVENOUS; SUBCUTANEOUS at 07:42

## 2017-06-14 RX ADMIN — BENZOCAINE AND MENTHOL 2 LOZENGE: 5; 1 LIQUID ORAL at 08:31

## 2017-06-14 RX ADMIN — HEPARIN SODIUM 5000 UNIT(S): 5000 INJECTION INTRAVENOUS; SUBCUTANEOUS at 12:00

## 2017-06-14 RX ADMIN — Medication 30 MILLIGRAM(S): at 17:48

## 2017-06-14 RX ADMIN — SIMETHICONE 80 MILLIGRAM(S): 80 TABLET, CHEWABLE ORAL at 08:31

## 2017-06-14 NOTE — PROGRESS NOTE ADULT - SUBJECTIVE AND OBJECTIVE BOX
HD#4    POD#1    Patient seen and examined at bedside. Pt complains of some incisional pain. Tolerating clears. Had episodes of O2 saturation in low 90s overnight which improved with O2 via nasal canula.     Patient is not yet ambulating. Has not yet passed flatus. Quiros is still in place. Denies CP, SOB, N/V, fevers, and chills.    Vital Signs Last 24 Hours  T(C): 36.9, Max: 36.9 (06-13 @ 13:46)  HR: 85 (84 - 106)  BP: 130/80 (88/56 - 159/94)  RR: 16 (16 - 20)  SpO2: 91% (91% - 98%)    I&O's Summary    I & Os for current day (as of 14 Jun 2017 07:51)  =============================================  IN: 2920 ml / OUT: 1290 ml / NET: 1630 ml    Physical Exam:  General: NAD, nasal cannula in place  CV: NR, RR, S1, S2, no M/R/G  Lungs: CTA-B  Abdomen: Soft, non-tender, non-distended, +BS  Incision: bandage removed, vertical below umbilicus, subcuticular closure, C/D/I  Ext: No pain or swelling    Labs:                        13.5   9.2   )-----------( 174      ( 14 Jun 2017 06:36 )             41.0   baso x      eos x      imm gran x      lymph x      mono x      poly x                            13.8   8.1   )-----------( 160      ( 13 Jun 2017 06:54 )             41.6   baso x      eos x      imm gran x      lymph x      mono x      poly x                            15.7   9.3   )-----------( 176      ( 11 Jun 2017 18:41 )             45.9   baso 0.2    eos 0.5    imm gran x      lymph 24.8   mono 6.2    poly 68.3     Blood Type: O Positive    MEDICATIONS  (STANDING):  HYDROmorphone PCA (1 mG/mL) 30milliLiter(s) PCA Continuous PCA Continuous  heparin  Injectable 5000Unit(s) SubCutaneous every 12 hours  lactated ringers. 1000milliLiter(s) IV Continuous <Continuous>  simethicone 80milliGRAM(s) Chew three times a day  levothyroxine 100MICROGram(s) Oral daily  ketorolac   Injectable 30milliGRAM(s) IV Push every 6 hours  acetaminophen  IVPB. 1000milliGRAM(s) IV Intermittent once  benzocaine 15 mG/menthol 3.6 mG Lozenge 2Lozenge Oral three times a day    MEDICATIONS  (PRN):  HYDROmorphone PCA (1 mG/mL) Rescue Clinician Bolus 0.5milliGRAM(s) IV Push every 15 minutes PRN for Pain Scale GREATER THAN 6  naloxone Injectable 0.1milliGRAM(s) IV Push every 3 minutes PRN For ANY of the following changes in patient status:  A. RR LESS THAN 10 breaths per minute, B. Oxygen saturation LESS THAN 90%, C. Sedation score of 6  ondansetron Injectable 4milliGRAM(s) IV Push every 6 hours PRN Nausea  docusate sodium 100milliGRAM(s) Oral three times a day PRN Stool Softening

## 2017-06-14 NOTE — PROGRESS NOTE ADULT - ATTENDING COMMENTS
GYN MIS Fellow Note    Patient seen and examined at bedside. Agree with above assessment and exam. V/S stable. H/H postop 13.5/41. Continue SCDs and heparin SC for DVT prophylaxis. Encouraged OOB and ambulation. GYN MIS Fellow Note    Patient seen and examined at bedside. Agree with above assessment and exam. V/S stable. H/H postop 13.5/41. Continue SCDs and heparin SC for DVT prophylaxis. Encouraged OOB and ambulation.    Attg note:  Pt seen and examined. Agree with above assessment and plan. Routine postop care.

## 2017-06-14 NOTE — PROGRESS NOTE ADULT - SUBJECTIVE AND OBJECTIVE BOX
Day  1  of Anesthesia Pain Management Service    SUBJECTIVE: Doing okay    Pain Scale Score	At rest: ___ 	With Activity: ___ 	[X ] Refer to charted pain scores    THERAPY:    [ ] IV PCA Morphine		[ ] 5 mg/mL	[ ] 1 mg/mL  [X ] IV PCA Hydromorphone	[ ] 5 mg/mL	[X ] 1 mg/mL  [ ] IV PCA Fentanyl		[ ] 50 micrograms/mL    Demand dose 0.2mg    lockout  6  minutes 0 Continuous Rate        MEDICATIONS  (STANDING):  HYDROmorphone PCA (1 mG/mL) 30milliLiter(s) PCA Continuous PCA Continuous  heparin  Injectable 5000Unit(s) SubCutaneous every 12 hours  lactated ringers. 1000milliLiter(s) IV Continuous <Continuous>  simethicone 80milliGRAM(s) Chew three times a day  levothyroxine 100MICROGram(s) Oral daily  ketorolac   Injectable 30milliGRAM(s) IV Push every 6 hours  benzocaine 15 mG/menthol 3.6 mG Lozenge 2Lozenge Oral three times a day    MEDICATIONS  (PRN):  HYDROmorphone PCA (1 mG/mL) Rescue Clinician Bolus 0.5milliGRAM(s) IV Push every 15 minutes PRN for Pain Scale GREATER THAN 6  naloxone Injectable 0.1milliGRAM(s) IV Push every 3 minutes PRN For ANY of the following changes in patient status:  A. RR LESS THAN 10 breaths per minute, B. Oxygen saturation LESS THAN 90%, C. Sedation score of 6  ondansetron Injectable 4milliGRAM(s) IV Push every 6 hours PRN Nausea  docusate sodium 100milliGRAM(s) Oral three times a day PRN Stool Softening      OBJECTIVE:    Sedation Score:	[X ] Alert	[ ] Drowsy 	[ ] Arousable	[ ] Asleep	[ ] Unresponsive    Side Effects:	[X ] None	[ ] Nausea	[ ] Vomiting	[ ] Pruritus  		[ ] Other:    Vital Signs Last 24 Hrs  T(C): 36.9, Max: 36.9 (06-13 @ 13:46)  T(F): 98.5, Max: 98.5 (06-14 @ 05:46)  HR: 85 (84 - 106)  BP: 130/80 (88/56 - 147/84)  BP(mean): 81 (67 - 86)  RR: 16 (16 - 20)  SpO2: 91% (91% - 98%)    ASSESSMENT/ PLAN    Therapy to  be:	[x ] Continue   [ ] Discontinued   [ ] Change to prn Analgesics    Documentation and Verification of current medications:   [X] Done	[ ] Not done, not elligible    Comments: Re-educated to PCA use

## 2017-06-14 NOTE — PROGRESS NOTE ADULT - SUBJECTIVE AND OBJECTIVE BOX
Pain Management Attending Addendum    SUBJECTIVE:    Therapy:	  [ x] IV PCA	   [ ] Epidural           [ ] s/p Spinal Opoid              [ ] Postpartum infusion	  [ ] Patient controlled regional anesthesia (PCRA)    [ ] prn Analgesics    OBJECTIVE:   [x ] No new signs     [ ] Other:    Side Effects:  [ x] None			[ ] Other:    Assessment of Catheter Site:		[ ] Intact		[ ] Other:    ASSESSMENT/PLAN  [x ] Continue current therapy    [ ] Therapy changed to:    [ ] IV PCA       [ ] Epidural     [ ] prn Analgesics     [ ] post partum infusion    Comments:

## 2017-06-14 NOTE — PROGRESS NOTE ADULT - PROBLEM SELECTOR PLAN 1
Neuro: Transition to po pain meds  CV: Hemodynamically stable, continue to monitor BPs, if hypertensive will Rx Hydralazine  Pulm: Saturating well on room air - now 97% without nasal cannula, encourage incentive spirometry, continue nicoderm patch for h/o current 2 ppd smoking  GI: continue regular diet  : UOP adequate and CBC stable, d/c lalo  Heme: c/w HSQ and SCDs for DVT ppx, H/H stable  Dispo: Continue routine post-op care

## 2017-06-15 ENCOUNTER — TRANSCRIPTION ENCOUNTER (OUTPATIENT)
Age: 57
End: 2017-06-15

## 2017-06-15 LAB
HBV SURFACE AB SER-ACNC: SIGNIFICANT CHANGE UP
HCT VFR BLD CALC: 38.3 % — SIGNIFICANT CHANGE UP (ref 34.5–45)
HGB BLD-MCNC: 12.2 G/DL — SIGNIFICANT CHANGE UP (ref 11.5–15.5)
INHIBIN B SERUM: < 10 PG/ML — SIGNIFICANT CHANGE UP
MCHC RBC-ENTMCNC: 31 PG — SIGNIFICANT CHANGE UP (ref 27–34)
MCHC RBC-ENTMCNC: 31.7 GM/DL — LOW (ref 32–36)
MCV RBC AUTO: 97.7 FL — SIGNIFICANT CHANGE UP (ref 80–100)
NON-GYNECOLOGICAL CYTOLOGY STUDY: SIGNIFICANT CHANGE UP
PLATELET # BLD AUTO: 164 K/UL — SIGNIFICANT CHANGE UP (ref 150–400)
RBC # BLD: 3.92 M/UL — SIGNIFICANT CHANGE UP (ref 3.8–5.2)
RBC # FLD: 12.7 % — SIGNIFICANT CHANGE UP (ref 10.3–14.5)
T PALLIDUM AB TITR SER: NEGATIVE — SIGNIFICANT CHANGE UP
WBC # BLD: 8 K/UL — SIGNIFICANT CHANGE UP (ref 3.8–10.5)
WBC # FLD AUTO: 8 K/UL — SIGNIFICANT CHANGE UP (ref 3.8–10.5)

## 2017-06-15 PROCEDURE — 71010: CPT | Mod: 26

## 2017-06-15 RX ORDER — OXYCODONE HYDROCHLORIDE 5 MG/1
5 TABLET ORAL EVERY 4 HOURS
Qty: 0 | Refills: 0 | Status: DISCONTINUED | OUTPATIENT
Start: 2017-06-15 | End: 2017-06-17

## 2017-06-15 RX ORDER — SODIUM CHLORIDE 9 MG/ML
3 INJECTION INTRAMUSCULAR; INTRAVENOUS; SUBCUTANEOUS EVERY 8 HOURS
Qty: 0 | Refills: 0 | Status: DISCONTINUED | OUTPATIENT
Start: 2017-06-15 | End: 2017-06-19

## 2017-06-15 RX ORDER — ACETAMINOPHEN 500 MG
975 TABLET ORAL EVERY 6 HOURS
Qty: 0 | Refills: 0 | Status: COMPLETED | OUTPATIENT
Start: 2017-06-15 | End: 2017-06-15

## 2017-06-15 RX ORDER — IBUPROFEN 200 MG
600 TABLET ORAL EVERY 6 HOURS
Qty: 0 | Refills: 0 | Status: DISCONTINUED | OUTPATIENT
Start: 2017-06-15 | End: 2017-06-19

## 2017-06-15 RX ADMIN — HEPARIN SODIUM 5000 UNIT(S): 5000 INJECTION INTRAVENOUS; SUBCUTANEOUS at 22:36

## 2017-06-15 RX ADMIN — OXYCODONE HYDROCHLORIDE 5 MILLIGRAM(S): 5 TABLET ORAL at 22:35

## 2017-06-15 RX ADMIN — SIMETHICONE 80 MILLIGRAM(S): 80 TABLET, CHEWABLE ORAL at 05:29

## 2017-06-15 RX ADMIN — HYDROMORPHONE HYDROCHLORIDE 30 MILLILITER(S): 2 INJECTION INTRAMUSCULAR; INTRAVENOUS; SUBCUTANEOUS at 08:34

## 2017-06-15 RX ADMIN — Medication 600 MILLIGRAM(S): at 19:32

## 2017-06-15 RX ADMIN — SODIUM CHLORIDE 3 MILLILITER(S): 9 INJECTION INTRAMUSCULAR; INTRAVENOUS; SUBCUTANEOUS at 05:33

## 2017-06-15 RX ADMIN — Medication 975 MILLIGRAM(S): at 13:08

## 2017-06-15 RX ADMIN — Medication 600 MILLIGRAM(S): at 19:02

## 2017-06-15 RX ADMIN — Medication 30 MILLIGRAM(S): at 13:24

## 2017-06-15 RX ADMIN — SODIUM CHLORIDE 3 MILLILITER(S): 9 INJECTION INTRAMUSCULAR; INTRAVENOUS; SUBCUTANEOUS at 22:36

## 2017-06-15 RX ADMIN — OXYCODONE HYDROCHLORIDE 5 MILLIGRAM(S): 5 TABLET ORAL at 19:02

## 2017-06-15 RX ADMIN — HEPARIN SODIUM 5000 UNIT(S): 5000 INJECTION INTRAVENOUS; SUBCUTANEOUS at 13:08

## 2017-06-15 RX ADMIN — SODIUM CHLORIDE 3 MILLILITER(S): 9 INJECTION INTRAMUSCULAR; INTRAVENOUS; SUBCUTANEOUS at 14:30

## 2017-06-15 RX ADMIN — Medication 100 MICROGRAM(S): at 22:35

## 2017-06-15 RX ADMIN — Medication 600 MILLIGRAM(S): at 23:51

## 2017-06-15 RX ADMIN — OXYCODONE HYDROCHLORIDE 5 MILLIGRAM(S): 5 TABLET ORAL at 23:53

## 2017-06-15 RX ADMIN — Medication 30 MILLIGRAM(S): at 05:29

## 2017-06-15 RX ADMIN — Medication 30 MILLIGRAM(S): at 00:22

## 2017-06-15 RX ADMIN — Medication 1 PATCH: at 13:07

## 2017-06-15 RX ADMIN — Medication 975 MILLIGRAM(S): at 13:40

## 2017-06-15 RX ADMIN — Medication 30 MILLIGRAM(S): at 13:50

## 2017-06-15 RX ADMIN — OXYCODONE HYDROCHLORIDE 5 MILLIGRAM(S): 5 TABLET ORAL at 19:30

## 2017-06-15 NOTE — PROGRESS NOTE ADULT - SUBJECTIVE AND OBJECTIVE BOX
HD#5   POD#2       Patient seen and examined at bedside, no acute overnight events. No acute complaints, pain controlled with PCA. No flatus. Pt's O2 saturation is currently 92% on 2L nasal cannula while sleeping.     Patient is ambulating, voiding spontaneously, and tolerating regular diet. Denies CP, SOB, N/V, fevers, and chills.    Vital Signs Last 24 Hours  T(C): 36.9, Max: 36.9 (06-14 @ 16:23)  HR: 80 (75 - 86)  BP: 159/94 (124/76 - 159/94)  RR: 18 (17 - 18)  SpO2: 95% (94% - 96%)  Wt(kg): --    I&O's Summary  I & Os for 24h ending 14 Jun 2017 07:00  =============================================  IN: 2920 ml / OUT: 1290 ml / NET: 1630 ml    I & Os for current day (as of 15 Chetan 2017 06:52)  =============================================  IN: 2510 ml / OUT: 1675 ml / NET: 835 ml    Physical Exam:  General: NAD, nasal cannula in place  CV: NR, RR, S1, S2, no M/R/G  Lungs: CTA-B  Abdomen: Abdominal binder in place, Soft, non-distended, mild tenderness, +BS  Incision:  vertical below umbilicus, subcuticular closure, C/D/I  Ext: No pain or swelling    Labs:                        13.5   9.2   )-----------( 174      ( 14 Jun 2017 06:36 )             41.0   baso x      eos x      imm gran x      lymph x      mono x      poly x                            13.8   8.1   )-----------( 160      ( 13 Jun 2017 06:54 )             41.6   baso x      eos x      imm gran x      lymph x      mono x      poly x        Blood Type: O Positive    MEDICATIONS  (STANDING):  HYDROmorphone PCA (1 mG/mL) 30milliLiter(s) PCA Continuous PCA Continuous  heparin  Injectable 5000Unit(s) SubCutaneous every 12 hours  simethicone 80milliGRAM(s) Chew three times a day  levothyroxine 100MICROGram(s) Oral daily  ketorolac   Injectable 30milliGRAM(s) IV Push every 6 hours  benzocaine 15 mG/menthol 3.6 mG Lozenge 2Lozenge Oral three times a day  nicotine - 21 mG/24Hr(s) Patch 1patch Transdermal daily  sodium chloride 0.9% lock flush 3milliLiter(s) IV Push every 8 hours    MEDICATIONS  (PRN):  HYDROmorphone PCA (1 mG/mL) Rescue Clinician Bolus 0.5milliGRAM(s) IV Push every 15 minutes PRN for Pain Scale GREATER THAN 6  naloxone Injectable 0.1milliGRAM(s) IV Push every 3 minutes PRN For ANY of the following changes in patient status:  A. RR LESS THAN 10 breaths per minute, B. Oxygen saturation LESS THAN 90%, C. Sedation score of 6  ondansetron Injectable 4milliGRAM(s) IV Push every 6 hours PRN Nausea  docusate sodium 100milliGRAM(s) Oral three times a day PRN Stool Softening

## 2017-06-15 NOTE — PROGRESS NOTE ADULT - ATTENDING COMMENTS
Pt seen and examined. Agree with above assessment and plan. Pulm Patient is status post low transverse  delivery for borderline 02 sats. increase ambulation/incentive spirometer.

## 2017-06-15 NOTE — PROGRESS NOTE ADULT - PROBLEM SELECTOR PLAN 1
Neuro: Transition to po analgesia  CV: Hemodynamically stable, continue to monitor BPs, if hypertensive will Rx Hydralazine  Pulm: 92-95% on 2L NC overnight. Pt with long-term smoking history and has not been to physicians, will discuss with medicine team for possible outpatient eval of patient's pulmonary function. encourage incentive spirometry, continue nicoderm patch for h/o current 2 ppd smoking  GI: continue regular diet  : voiding adequately  Heme: c/w HSQ and SCDs for DVT ppx, f/u AM CBC   Dispo: Continue routine post-op care

## 2017-06-15 NOTE — PROGRESS NOTE ADULT - SUBJECTIVE AND OBJECTIVE BOX
Pain Management Attending Addendum    SUBJECTIVE:    Therapy:	  [x ] IV PCA	   [ ] Epidural           [ ] s/p Spinal Opoid              [ ] Postpartum infusion	  [ ] Patient controlled regional anesthesia (PCRA)    [ ] prn Analgesics    OBJECTIVE:   [x ] No new signs     [ ] Other:    Side Effects:  [ x] None			[ ] Other:    Assessment of Catheter Site:		[ ] Intact		[ ] Other:    ASSESSMENT/PLAN  [ ] Continue current therapy    [x ] Therapy changed to:    [ ] IV PCA       [ ] Epidural     [x ] prn Analgesics     [ ] post partum infusion    Comments:

## 2017-06-15 NOTE — PROGRESS NOTE ADULT - SUBJECTIVE AND OBJECTIVE BOX
Day 2 of Anesthesia Pain Management Service    SUBJECTIVE: I'm okay    Pain Scale Score	At rest: ___ 	With Activity: ___ 	[x ] Refer to charted pain scores    THERAPY:    [ ] IV PCA Morphine		[ ] 5 mg/mL	[ ] 1 mg/mL  [X ] IV PCA Hydromorphone	[ ] 5 mg/mL	[X ] 1 mg/mL  [ ] IV PCA Fentanyl		[ ] 50 micrograms/mL    Demand dose    lockout    minutes   Continuous Rate        MEDICATIONS  (STANDING):  HYDROmorphone PCA (1 mG/mL) 30milliLiter(s) PCA Continuous PCA Continuous  heparin  Injectable 5000Unit(s) SubCutaneous every 12 hours  simethicone 80milliGRAM(s) Chew three times a day  levothyroxine 100MICROGram(s) Oral daily  ketorolac   Injectable 30milliGRAM(s) IV Push every 6 hours  benzocaine 15 mG/menthol 3.6 mG Lozenge 2Lozenge Oral three times a day  nicotine - 21 mG/24Hr(s) Patch 1patch Transdermal daily  sodium chloride 0.9% lock flush 3milliLiter(s) IV Push every 8 hours    MEDICATIONS  (PRN):  HYDROmorphone PCA (1 mG/mL) Rescue Clinician Bolus 0.5milliGRAM(s) IV Push every 15 minutes PRN for Pain Scale GREATER THAN 6  naloxone Injectable 0.1milliGRAM(s) IV Push every 3 minutes PRN For ANY of the following changes in patient status:  A. RR LESS THAN 10 breaths per minute, B. Oxygen saturation LESS THAN 90%, C. Sedation score of 6  ondansetron Injectable 4milliGRAM(s) IV Push every 6 hours PRN Nausea  docusate sodium 100milliGRAM(s) Oral three times a day PRN Stool Softening      OBJECTIVE:    Sedation Score:	[x ] Alert	[ ] Drowsy 	[ ] Arousable	[ ] Asleep	[ ] Unresponsive    Side Effects:	[ x] None	[ ] Nausea	[ ] Vomiting	[ ] Pruritus  		[ ] Other:    Vital Signs Last 24 Hrs  T(C): 36.9, Max: 36.9 (06-14 @ 16:23)  T(F): 98.4, Max: 98.4 (06-14 @ 16:23)  HR: 80 (75 - 86)  BP: 159/94 (124/76 - 159/94)  BP(mean): --  RR: 18 (17 - 18)  SpO2: 95% (94% - 96%)    ASSESSMENT/ PLAN    Therapy to  be:	[ ] Continue   [ x ] Discontinued   [ ] Change to prn Analgesics    Documentation and Verification of current medications:   [X] Done	[ ] Not done, not elligible    Comments:

## 2017-06-16 DIAGNOSIS — R09.02 HYPOXEMIA: ICD-10-CM

## 2017-06-16 LAB
ALBUMIN SERPL ELPH-MCNC: 3.5 G/DL — SIGNIFICANT CHANGE UP (ref 3.3–5)
ALP SERPL-CCNC: 60 U/L — SIGNIFICANT CHANGE UP (ref 40–120)
ALT FLD-CCNC: 22 U/L RC — SIGNIFICANT CHANGE UP (ref 10–45)
ANION GAP SERPL CALC-SCNC: 12 MMOL/L — SIGNIFICANT CHANGE UP (ref 5–17)
AST SERPL-CCNC: 26 U/L — SIGNIFICANT CHANGE UP (ref 10–40)
BILIRUB SERPL-MCNC: 0.4 MG/DL — SIGNIFICANT CHANGE UP (ref 0.2–1.2)
BUN SERPL-MCNC: 8 MG/DL — SIGNIFICANT CHANGE UP (ref 7–23)
CALCIUM SERPL-MCNC: 9.8 MG/DL — SIGNIFICANT CHANGE UP (ref 8.4–10.5)
CHLORIDE SERPL-SCNC: 98 MMOL/L — SIGNIFICANT CHANGE UP (ref 96–108)
CO2 SERPL-SCNC: 30 MMOL/L — SIGNIFICANT CHANGE UP (ref 22–31)
CREAT SERPL-MCNC: 0.61 MG/DL — SIGNIFICANT CHANGE UP (ref 0.5–1.3)
GAS PNL BLDA: SIGNIFICANT CHANGE UP
GLUCOSE SERPL-MCNC: 94 MG/DL — SIGNIFICANT CHANGE UP (ref 70–99)
HCT VFR BLD CALC: 44 % — SIGNIFICANT CHANGE UP (ref 34.5–45)
HGB BLD-MCNC: 14.3 G/DL — SIGNIFICANT CHANGE UP (ref 11.5–15.5)
MCHC RBC-ENTMCNC: 31.5 PG — SIGNIFICANT CHANGE UP (ref 27–34)
MCHC RBC-ENTMCNC: 32.4 GM/DL — SIGNIFICANT CHANGE UP (ref 32–36)
MCV RBC AUTO: 97 FL — SIGNIFICANT CHANGE UP (ref 80–100)
PLATELET # BLD AUTO: 209 K/UL — SIGNIFICANT CHANGE UP (ref 150–400)
POTASSIUM SERPL-MCNC: 4.3 MMOL/L — SIGNIFICANT CHANGE UP (ref 3.5–5.3)
POTASSIUM SERPL-SCNC: 4.3 MMOL/L — SIGNIFICANT CHANGE UP (ref 3.5–5.3)
PROT SERPL-MCNC: 7.2 G/DL — SIGNIFICANT CHANGE UP (ref 6–8.3)
RBC # BLD: 4.54 M/UL — SIGNIFICANT CHANGE UP (ref 3.8–5.2)
RBC # FLD: 12.7 % — SIGNIFICANT CHANGE UP (ref 10.3–14.5)
SODIUM SERPL-SCNC: 140 MMOL/L — SIGNIFICANT CHANGE UP (ref 135–145)
WBC # BLD: 7.8 K/UL — SIGNIFICANT CHANGE UP (ref 3.8–10.5)
WBC # FLD AUTO: 7.8 K/UL — SIGNIFICANT CHANGE UP (ref 3.8–10.5)

## 2017-06-16 PROCEDURE — 93306 TTE W/DOPPLER COMPLETE: CPT | Mod: 26

## 2017-06-16 PROCEDURE — 99233 SBSQ HOSP IP/OBS HIGH 50: CPT

## 2017-06-16 RX ORDER — HEPARIN SODIUM 5000 [USP'U]/ML
5000 INJECTION INTRAVENOUS; SUBCUTANEOUS EVERY 8 HOURS
Qty: 0 | Refills: 0 | Status: DISCONTINUED | OUTPATIENT
Start: 2017-06-16 | End: 2017-06-19

## 2017-06-16 RX ORDER — OXYCODONE HYDROCHLORIDE 5 MG/1
5 TABLET ORAL ONCE
Qty: 0 | Refills: 0 | Status: DISCONTINUED | OUTPATIENT
Start: 2017-06-16 | End: 2017-06-16

## 2017-06-16 RX ORDER — FUROSEMIDE 40 MG
40 TABLET ORAL ONCE
Qty: 0 | Refills: 0 | Status: COMPLETED | OUTPATIENT
Start: 2017-06-16 | End: 2017-06-16

## 2017-06-16 RX ADMIN — SODIUM CHLORIDE 3 MILLILITER(S): 9 INJECTION INTRAMUSCULAR; INTRAVENOUS; SUBCUTANEOUS at 06:16

## 2017-06-16 RX ADMIN — OXYCODONE HYDROCHLORIDE 5 MILLIGRAM(S): 5 TABLET ORAL at 18:14

## 2017-06-16 RX ADMIN — Medication 1 PATCH: at 16:03

## 2017-06-16 RX ADMIN — SIMETHICONE 80 MILLIGRAM(S): 80 TABLET, CHEWABLE ORAL at 01:54

## 2017-06-16 RX ADMIN — Medication 100 MICROGRAM(S): at 21:16

## 2017-06-16 RX ADMIN — OXYCODONE HYDROCHLORIDE 5 MILLIGRAM(S): 5 TABLET ORAL at 15:57

## 2017-06-16 RX ADMIN — Medication 1 PATCH: at 16:12

## 2017-06-16 RX ADMIN — OXYCODONE HYDROCHLORIDE 5 MILLIGRAM(S): 5 TABLET ORAL at 06:19

## 2017-06-16 RX ADMIN — SODIUM CHLORIDE 3 MILLILITER(S): 9 INJECTION INTRAMUSCULAR; INTRAVENOUS; SUBCUTANEOUS at 21:14

## 2017-06-16 RX ADMIN — OXYCODONE HYDROCHLORIDE 5 MILLIGRAM(S): 5 TABLET ORAL at 02:50

## 2017-06-16 RX ADMIN — OXYCODONE HYDROCHLORIDE 5 MILLIGRAM(S): 5 TABLET ORAL at 22:19

## 2017-06-16 RX ADMIN — OXYCODONE HYDROCHLORIDE 5 MILLIGRAM(S): 5 TABLET ORAL at 21:19

## 2017-06-16 RX ADMIN — BENZOCAINE AND MENTHOL 2 LOZENGE: 5; 1 LIQUID ORAL at 16:00

## 2017-06-16 RX ADMIN — HEPARIN SODIUM 5000 UNIT(S): 5000 INJECTION INTRAVENOUS; SUBCUTANEOUS at 21:16

## 2017-06-16 RX ADMIN — OXYCODONE HYDROCHLORIDE 5 MILLIGRAM(S): 5 TABLET ORAL at 04:09

## 2017-06-16 RX ADMIN — Medication 600 MILLIGRAM(S): at 06:19

## 2017-06-16 RX ADMIN — Medication 600 MILLIGRAM(S): at 15:57

## 2017-06-16 RX ADMIN — HEPARIN SODIUM 5000 UNIT(S): 5000 INJECTION INTRAVENOUS; SUBCUTANEOUS at 14:10

## 2017-06-16 RX ADMIN — SIMETHICONE 80 MILLIGRAM(S): 80 TABLET, CHEWABLE ORAL at 21:16

## 2017-06-16 RX ADMIN — Medication 40 MILLIGRAM(S): at 02:58

## 2017-06-16 RX ADMIN — OXYCODONE HYDROCHLORIDE 5 MILLIGRAM(S): 5 TABLET ORAL at 01:54

## 2017-06-16 RX ADMIN — SODIUM CHLORIDE 3 MILLILITER(S): 9 INJECTION INTRAMUSCULAR; INTRAVENOUS; SUBCUTANEOUS at 15:59

## 2017-06-16 RX ADMIN — Medication 600 MILLIGRAM(S): at 01:13

## 2017-06-16 RX ADMIN — SIMETHICONE 80 MILLIGRAM(S): 80 TABLET, CHEWABLE ORAL at 06:20

## 2017-06-16 RX ADMIN — Medication 600 MILLIGRAM(S): at 18:14

## 2017-06-16 NOTE — CONSULT NOTE ADULT - ASSESSMENT
55 yo F. Hx of Graves disease s/p GRIMALDO now with hypothyroidism, current 1-2 PPD smoker. Presents with RLQ pain found to have large ovarian mass now s/p ex-lap and resection found to be hypoxic on ambulation.
56f who presents with a chief complaint of RLQ pain, vaginal spotting, found to have 15cm R adnexal mass
57 y/o presents w/ RLQ pain and vaginal spotting, with new finding of a 15cm R adnexal mass.

## 2017-06-16 NOTE — PROGRESS NOTE ADULT - SUBJECTIVE AND OBJECTIVE BOX
Patient is a 56y old  Female who presents with a chief complaint of RLQ pain, vaginal spotting, found to have 15cm R adnexal mass (11 Jun 2017 21:07) Medicine consulted for hypertension.       SUBJECTIVE / OVERNIGHT EVENTS:    Last 24hours---140. No acute episodes of hypertension.         MEDICATIONS  (STANDING): Personally reviewed meds in [facility]. Listed as below.   heparin  Injectable 5000Unit(s) SubCutaneous every 12 hours  simethicone 80milliGRAM(s) Chew three times a day  levothyroxine 100MICROGram(s) Oral daily  benzocaine 15 mG/menthol 3.6 mG Lozenge 2Lozenge Oral three times a day  nicotine - 21 mG/24Hr(s) Patch 1patch Transdermal daily  sodium chloride 0.9% lock flush 3milliLiter(s) IV Push every 8 hours  ibuprofen  Tablet 600milliGRAM(s) Oral every 6 hours  oxyCODONE IR 5milliGRAM(s) Oral every 4 hours    MEDICATIONS  (PRN):  docusate sodium 100milliGRAM(s) Oral three times a day PRN Stool Softening      Vital Signs Last 24 Hrs  T(C): 36.8, Max: 36.9 (06-15 @ 13:27)  HR: 83 (83 - 102)  BP: 127/77 (127/77 - 148/83)  RR: 18 (16 - 18)  SpO2: 96% (78% - 98%)  Wt(kg): --  CAPILLARY BLOOD GLUCOSE    I&O's Summary    I & Os for current day (as of 16 Jun 2017 10:19)  =============================================  IN: 920 ml / OUT: 4750 ml / NET: -3830 ml      PHYSICAL EXAM:  GENERAL: NAD, well-developed  HEAD:  Atraumatic, Normocephalic  EYES: EOMI, PERRLA, conjunctiva and sclera clear  NECK: Supple, No JVD  CHEST/LUNG: Clear to auscultation bilaterally; No wheeze  HEART: Regular rate and rhythm; No murmurs, rubs, or gallops  ABDOMEN: Soft, Nontender, Nondistended; Bowel sounds present  EXTREMITIES:  2+ Peripheral Pulses, No clubbing, cyanosis, or edema  PSYCH: AAOx3  NEUROLOGY: non-focal  SKIN: No rashes or lesions    LABS:                        12.2   8.0   )-----------( 164      ( 15 Chetan 2017 06:50 )             38.3         RADIOLOGY & ADDITIONAL TESTS:    EKG Personally Reviewed: NSR HR 84     Consultant(s) Notes Reviewed:  Pulmonary consult and GYN progress note; Pulm evaluated for hypoxia improved with lasix, likely related to volume overload post procedure. Patient is a 56y old  Female who presents with a chief complaint of RLQ pain, vaginal spotting, found to have 15cm R adnexal mass (11 Jun 2017 21:07) Medicine consulted for hypertension.       SUBJECTIVE / OVERNIGHT EVENTS:    Last 24hours---140. No acute episodes of hypertension. She reports that overnight she was experiencing severe pain requiring additional pain medication. She also newly required supplemental O2 and was found to have b/l pleural effusions. She reports that she has been urinating a lot post lasix and feels subjectively better.         MEDICATIONS  (STANDING): Personally reviewed meds in [facility]. Listed as below.   heparin  Injectable 5000Unit(s) SubCutaneous every 12 hours  simethicone 80milliGRAM(s) Chew three times a day  levothyroxine 100MICROGram(s) Oral daily  benzocaine 15 mG/menthol 3.6 mG Lozenge 2Lozenge Oral three times a day  nicotine - 21 mG/24Hr(s) Patch 1patch Transdermal daily  sodium chloride 0.9% lock flush 3milliLiter(s) IV Push every 8 hours  ibuprofen  Tablet 600milliGRAM(s) Oral every 6 hours  oxyCODONE IR 5milliGRAM(s) Oral every 4 hours    MEDICATIONS  (PRN):  docusate sodium 100milliGRAM(s) Oral three times a day PRN Stool Softening      Vital Signs Last 24 Hrs  T(C): 36.8, Max: 36.9 (06-15 @ 13:27)  HR: 83 (83 - 102)  BP: 127/77 (127/77 - 148/83)  RR: 18 (16 - 18)  SpO2: 96% (78% - 98%)  Wt(kg): --  CAPILLARY BLOOD GLUCOSE    I&O's Summary    I & Os for current day (as of 16 Jun 2017 10:19)  =============================================  IN: 920 ml / OUT: 4750 ml / NET: -3830 ml      PHYSICAL EXAM:  GENERAL: NAD, well-developed, pleasant  HEAD:  Atraumatic, Normocephalic  EYES: EOMI, PERRLA, conjunctiva and sclera clear  NECK: Supple, No JVD  CHEST/LUNG: Clear to auscultation anteriorly (pt reported she was unable to sit up 2/2 discomfort)   HEART: Regular rate and rhythm; No murmurs, rubs, or gallops  ABDOMEN: Soft, nondistended, benign with abdominal band in place   EXTREMITIES:  2+ Peripheral Pulses, No clubbing, cyanosis, or edema  PSYCH: AAOx3  NEUROLOGY: non-focal  SKIN: No rashes or lesions    LABS:                        12.2   8.0   )-----------( 164      ( 15 Chetan 2017 06:50 )             38.3         RADIOLOGY & ADDITIONAL TESTS:    EKG Personally Reviewed: NSR HR 84     Consultant(s) Notes Reviewed:  Pulmonary consult and GYN progress note; Pulm evaluated for hypoxia improved with lasix, likely related to volume overload post procedure.

## 2017-06-16 NOTE — PROGRESS NOTE ADULT - ATTENDING COMMENTS
Agree with above assessment and plan. Routine postop care.  Appreciate pulm input. Ordering CT angio/ECHO. Pulm toilet.

## 2017-06-16 NOTE — CONSULT NOTE ADULT - SUBJECTIVE AND OBJECTIVE BOX
CHIEF COMPLAINT:    HPI:    PAST MEDICAL & SURGICAL HISTORY:  Hypothyroid  Delivered by  section      FAMILY HISTORY:  No pertinent family history in first degree relatives      SOCIAL HISTORY:  Smoking: [ ] Never Smoked [ ] Former Smoker (__ packs x ___ years) [ ] Current Smoker  (__ packs x ___ years)  Substance Use: [ ] Never Used [ ] Used ____  EtOH Use:  Marital Status: [ ] Single [ ]  [ ]  [ ]   Sexual History:   Occupation:  Recent Travel:  Country of Birth:  Advance Directives:    Allergies    No Known Allergies    Intolerances        HOME MEDICATIONS:    REVIEW OF SYSTEMS:  Constitutional: [ ] fevers [ ] chills [ ] weight loss [ ] weight gain  HEENT: [ ] dry eyes [ ] eye irritation [ ] postnasal drip [ ] nasal congestion  CV: [ ] chest pain [ ] orthopnea [ ] palpitations [ ] murmur  Resp: [ ] cough [ ] shortness of breath [ ] dyspnea [ ] wheezing [ ] sputum [ ] hemoptysis  GI: [ ] nausea [ ] vomiting [ ] diarrhea [ ] constipation [ ] abd pain [ ] dysphagia   : [ ] dysuria [ ] nocturia [ ] hematuria [ ] increased urinary frequency  Musculoskeletal: [ ] back pain [ ] myalgias [ ] arthralgias [ ] fracture  Skin: [ ] rash [ ] itch  Neurological: [ ] headache [ ] dizziness [ ] syncope [ ] weakness [ ] numbness  Psychiatric: [ ] anxiety [ ] depression  Endocrine: [ ] diabetes [ ] thyroid problem  Hematologic/Lymphatic: [ ] anemia [ ] bleeding problem  Allergic/Immunologic: [ ] itchy eyes [ ] nasal discharge [ ] hives [ ] angioedema  [ ] All other systems negative  [ ] Unable to assess ROS because ________    OBJECTIVE:  ICU Vital Signs Last 24 Hrs  T(C): 36.8, Max: 36.9 (06-15 @ 13:27)  T(F): 98.3, Max: 98.5 (06-15 @ 13:27)  HR: 102 (84 - 102)  BP: 142/93 (131/81 - 148/83)  BP(mean): --  ABP: --  ABP(mean): --  RR: 16 (16 - 18)  SpO2: 94% (78% - 98%)        I & Os for current day (as of  @ 07:15)  =============================================  IN: 920 ml / OUT: 4750 ml / NET: -3830 ml    CAPILLARY BLOOD GLUCOSE      PHYSICAL EXAM:  General:   HEENT:   Lymph Nodes:  Neck:   Respiratory:   Cardiovascular:   Abdomen:   Extremities:   Skin:   Neurological:  Psychiatry:    HOSPITAL MEDICATIONS:  MEDICATIONS  (STANDING):  heparin  Injectable 5000Unit(s) SubCutaneous every 12 hours  simethicone 80milliGRAM(s) Chew three times a day  levothyroxine 100MICROGram(s) Oral daily  benzocaine 15 mG/menthol 3.6 mG Lozenge 2Lozenge Oral three times a day  nicotine - 21 mG/24Hr(s) Patch 1patch Transdermal daily  sodium chloride 0.9% lock flush 3milliLiter(s) IV Push every 8 hours  ibuprofen  Tablet 600milliGRAM(s) Oral every 6 hours  oxyCODONE IR 5milliGRAM(s) Oral every 4 hours    MEDICATIONS  (PRN):  docusate sodium 100milliGRAM(s) Oral three times a day PRN Stool Softening      LABS:                        12.2   8.0   )-----------( 164      ( 15 Chetan 2017 06:50 )             38.3     Hgb Trend: 12.2<--, 13.5<--, 13.8<--, 15.7<--        Creatinine Trend: 0.73<--, 0.46<--, 0.72<--            MICROBIOLOGY:     RADIOLOGY:  [ ] Reviewed and interpreted by me    PULMONARY FUNCTION TESTS:    EKG: CHIEF COMPLAINT: RLQ pain    HPI:  57 yo F. Hx of Graves disease s/p GRIMALDO now with hypothyroidism, current smoker. Presents with vaginal spotting and subsequent acute RLQ pain that began Thursday () and on CT scan was found to have a 15cm R adnexal cyst with few septations, homogenous in nature. Pt underwent surgery - D&C, ex-lap w/ resection of ovarian mass tolerated surgery well. On POD#2 pt was noted to desaturate to 78% on RA with ambulation. CXR was performed which showed small b/l pleural effusions, pt was given lasix. Pt was noted as of yesterday evening to be +7 L for the admission. She put out about 4 L overnight with the lasix. Of note, pt has recently cut down on smoking to 1 PPD.     PAST MEDICAL & SURGICAL HISTORY:  Hypothyroid  Delivered by  section      FAMILY HISTORY:  No pertinent family history in first degree relatives      SOCIAL HISTORY:  Smoking: [ ] Never Smoked [ ] Former Smoker (__ packs x ___ years) [x ] Current Smoker  (_2_ packs x _40__ years)  Substance Use: [ ] Never Used [ ] Used ____  EtOH Use:  Marital Status: [ ] Single [ ]  [ ]  [ ]   Sexual History:   Occupation:  Recent Travel:  Country of Birth:  Advance Directives:    Allergies    No Known Allergies    Intolerances        HOME MEDICATIONS:  levothyroxine 100 mcg (0.1 mg) oral tablet: 1 tab(s) orally once a day,    REVIEW OF SYSTEMS:  Constitutional: [ ] fevers [ ] chills [ ] weight loss [ ] weight gain  CV: [ ] chest pain [ ] orthopnea [ ] palpitations [ ] murmur  Resp: [ ] cough [ ] shortness of breath [ ] dyspnea [ ] wheezing [ ] sputum [ ] hemoptysis  GI: [ ] nausea [ ] vomiting [ ] diarrhea [ ] constipation [ ] abd pain [ ] dysphagia   [ ] All other systems negative  [ ] Unable to assess ROS because ________    OBJECTIVE:  ICU Vital Signs Last 24 Hrs  T(C): 36.8, Max: 36.9 (06-15 @ 13:27)  T(F): 98.3, Max: 98.5 (06-15 @ 13:27)  HR: 102 (84 - 102)  BP: 142/93 (131/81 - 148/83)  BP(mean): --  ABP: --  ABP(mean): --  RR: 16 (16 - 18)  SpO2: 94% (78% - 98%)        I & Os for current day (as of  @ 07:15)  =============================================  IN: 920 ml / OUT: 4750 ml / NET: -3830 ml    CAPILLARY BLOOD GLUCOSE      PHYSICAL EXAM:  General:   HEENT:   Lymph Nodes:  Neck:   Respiratory:   Cardiovascular:   Abdomen:   Extremities:   Skin:   Neurological:  Psychiatry:    HOSPITAL MEDICATIONS:  MEDICATIONS  (STANDING):  heparin  Injectable 5000Unit(s) SubCutaneous every 12 hours  simethicone 80milliGRAM(s) Chew three times a day  levothyroxine 100MICROGram(s) Oral daily  benzocaine 15 mG/menthol 3.6 mG Lozenge 2Lozenge Oral three times a day  nicotine - 21 mG/24Hr(s) Patch 1patch Transdermal daily  sodium chloride 0.9% lock flush 3milliLiter(s) IV Push every 8 hours  ibuprofen  Tablet 600milliGRAM(s) Oral every 6 hours  oxyCODONE IR 5milliGRAM(s) Oral every 4 hours    MEDICATIONS  (PRN):  docusate sodium 100milliGRAM(s) Oral three times a day PRN Stool Softening      LABS:                        12.2   8.0   )-----------( 164      ( 15 Chetan 2017 06:50 )             38.3     Hgb Trend: 12.2<--, 13.5<--, 13.8<--, 15.7<--        Creatinine Trend: 0.73<--, 0.46<--, 0.72<--            MICROBIOLOGY:     RADIOLOGY:  [ ] Reviewed and interpreted by me    PULMONARY FUNCTION TESTS:    EKG: CHIEF COMPLAINT: RLQ pain    HPI:  57 yo F. Hx of Graves disease s/p GRIMALDO now with hypothyroidism, current smoker. Presents with vaginal spotting and subsequent acute RLQ pain that began Thursday () and on CT scan was found to have a 15cm R adnexal cyst with few septations, homogenous in nature. Pt underwent surgery - D&C, ex-lap w/ resection of ovarian mass tolerated surgery well. On POD#2 pt was noted to desaturate to 78% on RA with ambulation. CXR was performed which showed small b/l pleural effusions, pt was given lasix. Pt was noted as of yesterday evening to be +7 L for the admission. She put out about 4 L overnight with the lasix. Of note, pt has recently cut down on smoking to 1 PPD.     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  Recent Travel:  Country of Birth: [place]  Advance Directives: full    Allergies    No Known Allergies    Intolerances        HOME MEDICATIONS:  levothyroxine 100 mcg (0.1 mg) oral tablet: 1 tab(s) orally once a day,    REVIEW OF SYSTEMS:  Constitutional: [- ] fevers [- ] chills [ ] weight loss [ ] weight gain  CV: [- ] chest pain [ ] orthopnea [- ] palpitations [ ] murmur  Resp: [- ] cough [ -] shortness of breath [- ] dyspnea [ ] wheezing [ ] sputum [ ] hemoptysis  GI: [ -] nausea [- ] vomiting [ ] diarrhea [ ] constipation [+ ] abd pain [ ] dysphagia   [- ] All other systems negative  [ ] Unable to assess ROS because ________    OBJECTIVE:  ICU Vital Signs Last 24 Hrs  T(C): 36.8, Max: 36.9 (15 @ 13:27)  T(F): 98.3, Max: 98.5 (06-15 @ 13:27)  HR: 102 (84 - 102)  BP: 142/93 (131/81 - 148/83)  BP(mean): --  ABP: --  ABP(mean): --  RR: 16 (16 - 18)  SpO2: 94% (78% - 98%)        I & Os for current day (as of  @ 07:15)  =============================================  IN: 920 ml / OUT: 4750 ml / NET: -3830 ml    CAPILLARY BLOOD GLUCOSE      PHYSICAL EXAM:  General: NAD, well appearing  HEENT: MMM  Lymph Nodes: no cervical LN  Neck: supple  Respiratory: decreased BS b/l bases, no rales  Cardiovascular: s1s2 RRR  Abdomen: +binder  Extremities: warm, no edema  Neurological: no focal deficits  Psychiatry: AAOx3    HOSPITAL MEDICATIONS:  MEDICATIONS  (STANDING):  heparin  Injectable 5000Unit(s) SubCutaneous every 12 hours  simethicone 80milliGRAM(s) Chew three times a day  levothyroxine 100MICROGram(s) Oral daily  benzocaine 15 mG/menthol 3.6 mG Lozenge 2Lozenge Oral three times a day  nicotine - 21 mG/24Hr(s) Patch 1patch Transdermal daily  sodium chloride 0.9% lock flush 3milliLiter(s) IV Push every 8 hours  ibuprofen  Tablet 600milliGRAM(s) Oral every 6 hours  oxyCODONE IR 5milliGRAM(s) Oral every 4 hours    MEDICATIONS  (PRN):  docusate sodium 100milliGRAM(s) Oral three times a day PRN Stool Softening      LABS:                        12.2   8.0   )-----------( 164      ( 15 Chetan 2017 06:50 )             38.3     Hgb Trend: 12.2<--, 13.5<--, 13.8<--, 15.7<--        Creatinine Trend: 0.73<--, 0.46<--, 0.72<--            MICROBIOLOGY:     RADIOLOGY:  [x ] Reviewed and interpreted by me    CXR- b/l pleural effusions CHIEF COMPLAINT: RLQ pain    HPI:  55 yo F. Hx of Graves disease s/p GRIMALDO now with hypothyroidism, current smoker. Presents with vaginal spotting and subsequent acute RLQ pain that began Thursday () and on CT scan was found to have a 15cm R adnexal cyst with few septations, homogenous in nature. Pt underwent surgery - D&C, ex-lap w/ resection of ovarian mass tolerated surgery well. On POD#2 pt was noted to desaturate to 78% on RA with ambulation. CXR was performed which showed small b/l pleural effusions, pt was given lasix. Pt was noted as of yesterday evening to be +7 L for the admission. She put out about 4 L overnight with the lasix. Of note, pt has recently cut down on smoking to 1 PPD. Of note, pt gets about 1 epidsode of acute bronchitis requiring steroids and antibiotics.     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  Recent Travel:  Country of Birth: [place]  Advance Directives: full    Allergies    No Known Allergies    Intolerances        HOME MEDICATIONS:  levothyroxine 100 mcg (0.1 mg) oral tablet: 1 tab(s) orally once a day,    REVIEW OF SYSTEMS:  Constitutional: [- ] fevers [- ] chills [ ] weight loss [ ] weight gain  CV: [- ] chest pain [ ] orthopnea [- ] palpitations [ ] murmur  Resp: [- ] cough [ -] shortness of breath [- ] dyspnea [ ] wheezing [ ] sputum [ ] hemoptysis  GI: [ -] nausea [- ] vomiting [ ] diarrhea [ ] constipation [+ ] abd pain [ ] dysphagia   [- ] All other systems negative  [ ] Unable to assess ROS because ________    OBJECTIVE:  ICU Vital Signs Last 24 Hrs  T(C): 36.8, Max: 36.9 (06-15 @ 13:27)  T(F): 98.3, Max: 98.5 (06-15 @ 13:27)  HR: 102 (84 - 102)  BP: 142/93 (131/81 - 148/83)  BP(mean): --  ABP: --  ABP(mean): --  RR: 16 (16 - 18)  SpO2: 94% (78% - 98%)        I & Os for current day (as of  @ 07:15)  =============================================  IN: 920 ml / OUT: 4750 ml / NET: -3830 ml    CAPILLARY BLOOD GLUCOSE      PHYSICAL EXAM:  General: NAD, well appearing  HEENT: MMM  Lymph Nodes: no cervical LN  Neck: supple  Respiratory: decreased BS b/l bases, no rales  Cardiovascular: s1s2 RRR  Abdomen: +binder  Extremities: warm, no edema  Neurological: no focal deficits  Psychiatry: [address] MEDICATIONS:  MEDICATIONS  (STANDING):  heparin  Injectable 5000Unit(s) SubCutaneous every 12 hours  simethicone 80milliGRAM(s) Chew three times a day  levothyroxine 100MICROGram(s) Oral daily  benzocaine 15 mG/menthol 3.6 mG Lozenge 2Lozenge Oral three times a day  nicotine - 21 mG/24Hr(s) Patch 1patch Transdermal daily  sodium chloride 0.9% lock flush 3milliLiter(s) IV Push every 8 hours  ibuprofen  Tablet 600milliGRAM(s) Oral every 6 hours  oxyCODONE IR 5milliGRAM(s) Oral every 4 hours    MEDICATIONS  (PRN):  docusate sodium 100milliGRAM(s) Oral three times a day PRN Stool Softening      LABS:                        12.2   8.0   )-----------( 164      ( 15 Chetan 2017 06:50 )             38.3     Hgb Trend: 12.2<--, 13.5<--, 13.8<--, 15.7<--        Creatinine Trend: 0.73<--, 0.46<--, 0.72<--            MICROBIOLOGY:     RADIOLOGY:  [x ] Reviewed and interpreted by me    CXR- b/l pleural effusions

## 2017-06-16 NOTE — PROGRESS NOTE ADULT - SUBJECTIVE AND OBJECTIVE BOX
R4 GYN Progress Note    Pt seen and examined at bedside. Pain controlled. Denies CP, sob, n/v, fevers, chills. +OOB, +flatus, + Voiding. Tolerating reg diet.        MEDICATIONS  (STANDING):  simethicone 80milliGRAM(s) Chew three times a day  levothyroxine 100MICROGram(s) Oral daily  benzocaine 15 mG/menthol 3.6 mG Lozenge 2Lozenge Oral three times a day  nicotine - 21 mG/24Hr(s) Patch 1patch Transdermal daily  sodium chloride 0.9% lock flush 3milliLiter(s) IV Push every 8 hours  ibuprofen  Tablet 600milliGRAM(s) Oral every 6 hours  oxyCODONE IR 5milliGRAM(s) Oral every 4 hours  heparin  Injectable 5000Unit(s) SubCutaneous every 8 hours    MEDICATIONS  (PRN):  docusate sodium 100milliGRAM(s) Oral three times a day PRN Stool Softening      Allergies    No Known Allergies    Intolerances        12 point ROS negative except as outlined above    Vital Signs Last 24 Hrs  T(C): 36.8, Max: 36.8 (06-16 @ 05:36)  T(F): 98.3, Max: 98.3 (06-16 @ 05:36)  HR: 82 (82 - 102)  BP: 144/90 (127/77 - 144/90)  BP(mean): --  RR: 18 (16 - 18)  SpO2: 100% (94% - 100%)      PHYSICAL EXAM:    GA: NAD, A+0 x 3  CV: RRR  Pulm: b/l crackles on the lower lobes  Abd: soft, nontender, nondistended, no rebound or guarding, +BS  Incision: clean, dry and intact  Extremities: no swelling or calf tenderness, reflexes +2 bilaterally    LABS:                        14.3   7.8   )-----------( 209      ( 16 Jun 2017 11:52 )             44.0     06-16    140  |  98  |  8   ----------------------------<  94  4.3   |  30  |  0.61    Ca    9.8      16 Jun 2017 11:52    TPro  7.2  /  Alb  3.5  /  TBili  0.4  /  DBili  x   /  AST  26  /  ALT  22  /  AlkPhos  60  06-16          RADIOLOGY & ADDITIONAL TESTS:

## 2017-06-16 NOTE — PROGRESS NOTE ADULT - SUBJECTIVE AND OBJECTIVE BOX
POD#3   HD#6    Patient seen and examined at bedside. Patient had 1 episode of sharp pain overnight requiring oxycodone. Pain well controlled now.CXR completed overnight demonstrated bilateral pleural effusions. Lasix 40mg given x 1.  Patient is ambulating, passing flatus, voiding spontaneously, and tolerating regular diet. Denies CP, SOB, N/V, fevers, and chills.    Vital Signs Last 24 Hours  T(C): 36.8, Max: 36.9 (06-15 @ 13:27)  HR: 102 (84 - 102)  BP: 142/93 (131/81 - 148/83)  RR: 16 (16 - 18)  SpO2: 94% (78% - 98%)      I&O's Summary  I & Os for 24h ending 15 Chetan 2017 07:00  =============================================  IN: 2510 ml / OUT: 1675 ml / NET: 835 ml    I & Os for current day (as of 16 Jun 2017 06:49)  =============================================  IN: 920 ml / OUT: 4750 ml / NET: -3830 ml      Physical Exam:  General: NAD  CV: NR, RR, S1, S2, no M/R/G  Lungs: CTA-B  Abdomen: Soft, non-tender, non-distended, +BS  Incision: vertical,CDI  Ext: No pain or swelling    Labs:                        12.2   8.0   )-----------( 164      ( 15 Chetan 2017 06:50 )             38.3   baso x      eos x      imm gran x      lymph x      mono x      poly x                            13.5   9.2   )-----------( 174      ( 14 Jun 2017 06:36 )             41.0   baso x      eos x      imm gran x      lymph x      mono x      poly x                            13.8   8.1   )-----------( 160      ( 13 Jun 2017 06:54 )             41.6   baso x      eos x      imm gran x      lymph x      mono x      poly x          MEDICATIONS  (STANDING):  heparin  Injectable 5000Unit(s) SubCutaneous every 12 hours  simethicone 80milliGRAM(s) Chew three times a day  levothyroxine 100MICROGram(s) Oral daily  benzocaine 15 mG/menthol 3.6 mG Lozenge 2Lozenge Oral three times a day  nicotine - 21 mG/24Hr(s) Patch 1patch Transdermal daily  sodium chloride 0.9% lock flush 3milliLiter(s) IV Push every 8 hours  ibuprofen  Tablet 600milliGRAM(s) Oral every 6 hours  oxyCODONE IR 5milliGRAM(s) Oral every 4 hours    MEDICATIONS  (PRN):  docusate sodium 100milliGRAM(s) Oral three times a day PRN Stool Softening POD#3   HD#6    Patient seen and examined at bedside. Patient had 1 episode of sharp pain overnight requiring oxycodone. Pain well controlled now.CXR completed overnight demonstrated bilateral pleural effusions. Lasix 40mg given x 1.  Patient is ambulating, passing flatus, voiding spontaneously, and tolerating regular diet. Denies CP, SOB, N/V, fevers, and chills.    Vital Signs Last 24 Hours  T(C): 36.8, Max: 36.9 (06-15 @ 13:27)  HR: 102 (84 - 102)  BP: 142/93 (131/81 - 148/83)  RR: 16 (16 - 18)  SpO2: 94% (78% - 98%)      I&O's Summary  I & Os for 24h ending 15 Chetan 2017 07:00  =============================================  IN: 2510 ml / OUT: 1675 ml / NET: 835 ml    I & Os for current day (as of 16 Jun 2017 06:49)  =============================================  IN: 920 ml / OUT: 4750 ml / NET: -3830 ml      Physical Exam:  General: NAD  CV: NR, RR, S1, S2, no M/R/G  Lungs: Bilateral crackles  Abdomen: Soft, non-tender, non-distended, +BS  Incision: vertical,CDI  Ext: No pain or swelling    Labs:                        12.2   8.0   )-----------( 164      ( 15 Chetan 2017 06:50 )             38.3   baso x      eos x      imm gran x      lymph x      mono x      poly x                            13.5   9.2   )-----------( 174      ( 14 Jun 2017 06:36 )             41.0   baso x      eos x      imm gran x      lymph x      mono x      poly x                            13.8   8.1   )-----------( 160      ( 13 Jun 2017 06:54 )             41.6   baso x      eos x      imm gran x      lymph x      mono x      poly x          MEDICATIONS  (STANDING):  heparin  Injectable 5000Unit(s) SubCutaneous every 12 hours  simethicone 80milliGRAM(s) Chew three times a day  levothyroxine 100MICROGram(s) Oral daily  benzocaine 15 mG/menthol 3.6 mG Lozenge 2Lozenge Oral three times a day  nicotine - 21 mG/24Hr(s) Patch 1patch Transdermal daily  sodium chloride 0.9% lock flush 3milliLiter(s) IV Push every 8 hours  ibuprofen  Tablet 600milliGRAM(s) Oral every 6 hours  oxyCODONE IR 5milliGRAM(s) Oral every 4 hours    MEDICATIONS  (PRN):  docusate sodium 100milliGRAM(s) Oral three times a day PRN Stool Softening

## 2017-06-16 NOTE — PROGRESS NOTE ADULT - PROBLEM SELECTOR PLAN 1
Neuro: Controlled on PO meds.  CV: Hemodynamically stable, continue to monitor BPs, if hypertensive will Rx Hydralazine. Will follow up AM CBC.  Pulm: 94-98% on 2L NC overnight. Upon ambulation, saturation decreases. CXR completed overnight demonstrated bilateral pleural effusions. Awaiting final read. Pt with long-term smoking history. Pulmonary team to see patient in AM.  GI: continue regular diet  : voiding adequately  Heme: c/w HSQ and SCDs for DVT ppx  Dispo: Continue routine post-op care    KAVEH Ramey, PGY2  #[id number] Neuro: Controlled on PO meds.  CV: Hemodynamically stable, continue to monitor BPs, if hypertensive will Rx Hydralazine. Will follow up AM CBC.  Pulm: 94-98% on 2L NC overnight. Upon ambulation, patient becomes hypoxic. CXR completed overnight demonstrated bilateral pleural effusions. Awaiting final read. Pt with long-term smoking history. Pulmonary team to see patient in AM.  GI: continue regular diet  : voiding adequately  Heme: c/w HSQ and SCDs for DVT ppx  Dispo: Continue routine post-op care    KAVEH Ramey, PGY2  #[id number]

## 2017-06-16 NOTE — CONSULT NOTE ADULT - ATTENDING COMMENTS
Patient seen and examined at the bedside.   CT images reviewed on PACS and case discussed with housestaff.   Large pelvic mass without high risk features for malignancy.   Normal . No family history of cancer.    I reviewed the differential diagnosis of a pelvic mass including benign, borderline and malignancy ovarian neoplasms.   I agree with primary GYN team for surgery tomorrow.   Would recommend vertical laparotomy and RSO for definitive diagnosis.   Reviewed surgical management of ovarian and endometrial cancers in detail.   Described the surgical staging procedure including LND, omentectomy and peritoneal assessment.   Awaiting EMBx results, if benign would strongly recommend a D&C with frozen if hysterectomy is not planned.   Risk and benefits of surgery reviewed with patient and her daughter.   All questions answered.   GYN oncology will be available on standby should diagnosis prove to be cancer.
While she has a physiologic explanation for hypoxemia other than PE- likely has COPD, has abdominal binder and is splinting resulting in increased V/Q mismatch, also had some signs of fluid overload and atelectasis on CXR  (R basal plate-like atelectasis) which could lead to hypoxemia from shunt, she still is desating to 85% off oxygen, therefore I would check CT A to rule out PE, we did bedside focused  echo that showed likely normal EF, LVH,no signs of RV overload but would check formal echo as well as room air ABG. She will need outpatient pulmonary follow up as I suspect she has FRANNY          (desats while sleeping as per RN), COPD. Can use bronchodilators as necessary and needs to get out of bed and ambulate, incentive spirometry. increase SQ heparin to 5000 u Q8H
[phone]

## 2017-06-16 NOTE — PROGRESS NOTE ADULT - PROBLEM SELECTOR PLAN 1
Hypertension likely related to pain post procedure. BP well controlled over the last 24 hours and has not required any antihypertensives.   - Continue to monitor BP per protocol.   - c/w pain control and if any SBP > 180 would treat with PO hydralazine.   - When medically optimized for discharge per gyn would advise patient to follow up with her PCP for routine care and continued monitoring of her BP in an outpatient setting.   - At this time, will sign off. Please feel free to reconsult with any additional questions.

## 2017-06-16 NOTE — CONSULT NOTE ADULT - PROBLEM SELECTOR RECOMMENDATION 9
-  - pt responded to lasix - mostly asymptomatic during hypoxia episodes  - CXR did show b/l effusions w/ atelectasis; bedside US showed some dependent B-lines, no effusions and bedside echo showed normal RR to LV size ratio and possibly some mildly decreased LV function with a thick walled LV   - suspect etiology of hypoxia is multifactorial- pulmonary edema/pleural effusions from fluid overload state s/p lasix to which she responded well + atelectasis and hypoventilation from splinting from pain and from binder which is restricting deep breathing + pt likely has COPD given smoking hx   - recommend obtaining CTA to r/o PE- pt needs lung cancer screening and she was till hypoxic on RA to high 80s   - check echo- may have diastolic dysfunction, as she had LVH on EKG and what appeared to be thickened LV on bedside echo, which may explain why she had fluid overload with the fluids she received perioperatively  - check ABG on RA  - check BMP to ensure lytes are ok after diuresis  - will need pulm f/u for PFTs upon d/c  - incentive spirometry and OOB as tolerated   - increase heparin SC to 500 q8  - will continue to follow

## 2017-06-17 DIAGNOSIS — R09.02 HYPOXEMIA: ICD-10-CM

## 2017-06-17 LAB
HCT VFR BLD CALC: 41.3 % — SIGNIFICANT CHANGE UP (ref 34.5–45)
HGB BLD-MCNC: 13.7 G/DL — SIGNIFICANT CHANGE UP (ref 11.5–15.5)
MCHC RBC-ENTMCNC: 32 PG — SIGNIFICANT CHANGE UP (ref 27–34)
MCHC RBC-ENTMCNC: 33.1 GM/DL — SIGNIFICANT CHANGE UP (ref 32–36)
MCV RBC AUTO: 96.6 FL — SIGNIFICANT CHANGE UP (ref 80–100)
PLATELET # BLD AUTO: 219 K/UL — SIGNIFICANT CHANGE UP (ref 150–400)
RBC # BLD: 4.27 M/UL — SIGNIFICANT CHANGE UP (ref 3.8–5.2)
RBC # FLD: 12.4 % — SIGNIFICANT CHANGE UP (ref 10.3–14.5)
WBC # BLD: 6.8 K/UL — SIGNIFICANT CHANGE UP (ref 3.8–10.5)
WBC # FLD AUTO: 6.8 K/UL — SIGNIFICANT CHANGE UP (ref 3.8–10.5)

## 2017-06-17 PROCEDURE — 71275 CT ANGIOGRAPHY CHEST: CPT | Mod: 26,77

## 2017-06-17 PROCEDURE — 71010: CPT | Mod: 26

## 2017-06-17 PROCEDURE — 99233 SBSQ HOSP IP/OBS HIGH 50: CPT | Mod: GC

## 2017-06-17 RX ORDER — OXYCODONE HYDROCHLORIDE 5 MG/1
5 TABLET ORAL ONCE
Qty: 0 | Refills: 0 | Status: DISCONTINUED | OUTPATIENT
Start: 2017-06-17 | End: 2017-06-17

## 2017-06-17 RX ORDER — OXYCODONE HYDROCHLORIDE 5 MG/1
5 TABLET ORAL EVERY 4 HOURS
Qty: 0 | Refills: 0 | Status: DISCONTINUED | OUTPATIENT
Start: 2017-06-17 | End: 2017-06-19

## 2017-06-17 RX ADMIN — SIMETHICONE 80 MILLIGRAM(S): 80 TABLET, CHEWABLE ORAL at 06:20

## 2017-06-17 RX ADMIN — OXYCODONE HYDROCHLORIDE 5 MILLIGRAM(S): 5 TABLET ORAL at 07:19

## 2017-06-17 RX ADMIN — SIMETHICONE 80 MILLIGRAM(S): 80 TABLET, CHEWABLE ORAL at 13:18

## 2017-06-17 RX ADMIN — BENZOCAINE AND MENTHOL 2 LOZENGE: 5; 1 LIQUID ORAL at 13:23

## 2017-06-17 RX ADMIN — Medication 600 MILLIGRAM(S): at 00:14

## 2017-06-17 RX ADMIN — Medication 600 MILLIGRAM(S): at 13:16

## 2017-06-17 RX ADMIN — OXYCODONE HYDROCHLORIDE 5 MILLIGRAM(S): 5 TABLET ORAL at 13:48

## 2017-06-17 RX ADMIN — Medication 600 MILLIGRAM(S): at 01:14

## 2017-06-17 RX ADMIN — Medication 100 MICROGRAM(S): at 21:05

## 2017-06-17 RX ADMIN — SODIUM CHLORIDE 3 MILLILITER(S): 9 INJECTION INTRAMUSCULAR; INTRAVENOUS; SUBCUTANEOUS at 21:00

## 2017-06-17 RX ADMIN — Medication 1 PATCH: at 13:24

## 2017-06-17 RX ADMIN — SODIUM CHLORIDE 3 MILLILITER(S): 9 INJECTION INTRAMUSCULAR; INTRAVENOUS; SUBCUTANEOUS at 13:13

## 2017-06-17 RX ADMIN — HEPARIN SODIUM 5000 UNIT(S): 5000 INJECTION INTRAVENOUS; SUBCUTANEOUS at 06:20

## 2017-06-17 RX ADMIN — OXYCODONE HYDROCHLORIDE 5 MILLIGRAM(S): 5 TABLET ORAL at 22:05

## 2017-06-17 RX ADMIN — Medication 1 PATCH: at 13:19

## 2017-06-17 RX ADMIN — OXYCODONE HYDROCHLORIDE 5 MILLIGRAM(S): 5 TABLET ORAL at 19:47

## 2017-06-17 RX ADMIN — OXYCODONE HYDROCHLORIDE 5 MILLIGRAM(S): 5 TABLET ORAL at 13:17

## 2017-06-17 RX ADMIN — SODIUM CHLORIDE 3 MILLILITER(S): 9 INJECTION INTRAMUSCULAR; INTRAVENOUS; SUBCUTANEOUS at 06:17

## 2017-06-17 RX ADMIN — HEPARIN SODIUM 5000 UNIT(S): 5000 INJECTION INTRAVENOUS; SUBCUTANEOUS at 13:23

## 2017-06-17 RX ADMIN — OXYCODONE HYDROCHLORIDE 5 MILLIGRAM(S): 5 TABLET ORAL at 06:20

## 2017-06-17 RX ADMIN — Medication 600 MILLIGRAM(S): at 06:19

## 2017-06-17 RX ADMIN — OXYCODONE HYDROCHLORIDE 5 MILLIGRAM(S): 5 TABLET ORAL at 19:10

## 2017-06-17 RX ADMIN — HEPARIN SODIUM 5000 UNIT(S): 5000 INJECTION INTRAVENOUS; SUBCUTANEOUS at 21:07

## 2017-06-17 RX ADMIN — OXYCODONE HYDROCHLORIDE 5 MILLIGRAM(S): 5 TABLET ORAL at 03:25

## 2017-06-17 RX ADMIN — Medication 600 MILLIGRAM(S): at 18:15

## 2017-06-17 RX ADMIN — SIMETHICONE 80 MILLIGRAM(S): 80 TABLET, CHEWABLE ORAL at 21:05

## 2017-06-17 RX ADMIN — Medication 600 MILLIGRAM(S): at 19:10

## 2017-06-17 RX ADMIN — Medication 600 MILLIGRAM(S): at 13:48

## 2017-06-17 RX ADMIN — OXYCODONE HYDROCHLORIDE 5 MILLIGRAM(S): 5 TABLET ORAL at 21:05

## 2017-06-17 RX ADMIN — OXYCODONE HYDROCHLORIDE 5 MILLIGRAM(S): 5 TABLET ORAL at 02:29

## 2017-06-17 RX ADMIN — OXYCODONE HYDROCHLORIDE 5 MILLIGRAM(S): 5 TABLET ORAL at 18:14

## 2017-06-17 RX ADMIN — Medication 600 MILLIGRAM(S): at 07:19

## 2017-06-17 NOTE — PROGRESS NOTE ADULT - PROBLEM SELECTOR PLAN 2
Saturating well on 02. Increase incentive spirometry. appreciate pulm recommendations and input. Will attempt to wean patient off O2 today if pulm agrees. Patient has Echo WNL, CXR with bilateral pleural effusions status post lasix. Denies respiratory complaints. Talked to patient at length understands with long hx of smoking and surgery she is at risk for a PE, understands that a CTA will be the optimal test to rule out a embolus. currently refusing another IV to be placed or a CT scan to be done.     will follow up pulm recs
surgery planned for today.  Please see Dr. Elena's note for pre-op optimization note.
Possibly related to fluid overload. Pulm evaluated and ongoing work up per pulm.

## 2017-06-17 NOTE — PROGRESS NOTE ADULT - ATTENDING COMMENTS
56 year old woman s/p ex-lap found to be hypoxic s/p diuresis with some improvement in oxygenation however has not returned to baseline, failed two attempts at CTA secondary to IV access issues    hypoxia could be due to atelectasis vs PE    please check room air saturations with ambulations  encourage incentive spirometry use  titrate oxygen down as tolerated goal to keep saturation > 92%  check lower extremity dopplers   encourage smoking cessation

## 2017-06-17 NOTE — PROGRESS NOTE ADULT - SUBJECTIVE AND OBJECTIVE BOX
CHIEF COMPLAINT: hypoxia    Interval Events:  Patient was unable to get her CTA chest overnight due to access issues.    Denies any cp or sob.  Only endorses weakness from being awake all night.    REVIEW OF SYSTEMS:    CONSTITUTIONAL: +weakness, no fevers or chills  EYES/ENT: No visual changes;  No vertigo, throat pain, or post-nasal drip; no nasal congestion  NECK: No pain or stiffness  RESPIRATORY: No cough, wheezing, hemoptysis; No shortness of breath  CARDIOVASCULAR: No chest pain or palpitations  GASTROINTESTINAL: No nausea, vomiting, or hematemesis; No diarrhea or constipation. No melena or hematochezia.  GENITOURINARY: No dysuria, frequency or hematuria  NEUROLOGICAL: No numbness or weakness  SKIN: No itching, burning, rashes, or lesions   MSK: no back pain, myalgias, or arthralgias  ALLERGIC/IMMUNOLOGIC: no nasal discharge or itchy eyes    OBJECTIVE:  ICU Vital Signs Last 24 Hrs  T(C): 36.5, Max: 37.1 (06-16 @ 18:27)  T(F): 97.7, Max: 98.7 (06-16 @ 18:27)  HR: 86 (82 - 104)  BP: 164/94 (135/86 - 164/94)    RR: 18 (18 - 18)  SpO2: 99% (96% - 100%)        I & Os for current day (as of 06-17 @ 13:06)  =============================================  IN: 1020 ml / OUT: 1350 ml / NET: -330 ml    CAPILLARY BLOOD GLUCOSE      PHYSICAL EXAM:  General: NAD  HEENT: PERRLA, normal oropharynx  Lymph Nodes: no cervical LAD  Neck: no JVD  Respiratory: CTAB except for decreased BS at the bases, no w/r/r  Cardiovascular: RRR, nl s1/s2  Abdomen: +abd binder  Extremities: no peripheral edema, no cyanosis  Skin: no rashes  Neurological: no focal deficits  Psychiatry: normal affect    HOSPITAL MEDICATIONS:  heparin  Injectable 5000Unit(s) SubCutaneous every 8 hours      levothyroxine 100MICROGram(s) Oral daily    ibuprofen  Tablet 600milliGRAM(s) Oral every 6 hours  oxyCODONE IR 5milliGRAM(s) Oral every 4 hours    docusate sodium 100milliGRAM(s) Oral three times a day PRN  simethicone 80milliGRAM(s) Chew three times a day    benzocaine 15 mG/menthol 3.6 mG Lozenge 2Lozenge Oral three times a day    nicotine - 21 mG/24Hr(s) Patch 1patch Transdermal daily      LABS:                        13.7   6.8   )-----------( 219      ( 17 Jun 2017 06:13 )             41.3     Hgb Trend: 13.7<--, 14.3<--, 12.2<--, 13.5<--, 13.8      140  |  98  |  8   ----------------------------<  94  4.3   |  30  |  0.61    Ca    9.8      16 Jun 2017 11:52    TPro  7.2  /  Alb  3.5  /  TBili  0.4  /  DBili  x   /  AST  26  /  ALT  22  /  AlkPhos  60  06-16    Creatinine Trend: 0.61<--, 0.73<--, 0.46<--, 0.72      Arterial Blood Gas:  06-16 @ 12:36  7.47/44/55/31/89/7.0      RADIOLOGY:  [x] Reviewed and interpreted by me: 6/15 cxr b/l pleural effusions    TTE: 6/16/17:  1. Normal mitral valve. Minimal mitral regurgitation.  2. Calcified aortic valve.  3. Endocardium not well visualized and tachycardia limits  accurate assessment of LV systolic function; grossly normal  left ventricular systolic function.  4. Reversal of the E-A waves of the mitral inflow pattern  consistent with reduced compliance of the left ventricle.  5. The right ventricle is not well visualized; grossly  normal right ventricular systolic function.  *** No previous Echo exam.

## 2017-06-17 NOTE — CHART NOTE - NSCHARTNOTEFT_GEN_A_CORE
Pt seen at bedside to discuss plan. After ambulation 02 sat was 87-88% on RA. Discussed w/pulmonology that plan is for LE Doppler today. Will recheck O2 sat on ambulation tomorrow and most likely will do CTA. Patient still refusing IV to be placed given that 2 IVS infiltrated yesterday.    KAVEH Ramey, PGY2  #[id number]

## 2017-06-17 NOTE — PROGRESS NOTE ADULT - PROBLEM SELECTOR PLAN 1
- patient with predominant a-line pattern on bedside lung ultrasound today, no effusions noted.  No need for diuresis today  - hypoxia could be multifactorial: atelecasis and fluid overload +/- PE  - TTE reviewed: suggestive of diastolic dysfunction  - patient O2 sat 93% on RA at rest; please check room air oxygen saturation upon ambulation  - if still hypoxic with ambulation, would check lower extremity dopplers to r/o DVT; if negative, would pursue CTA chest to r/o PE  - no need for diuresis today; however, given suggestion of diastolic dysfunction with reduced LV compliance on TTE, would favor blood pressure control (AM SBP was in 160s)  - continue with incentive spirometry, encourage ambulation

## 2017-06-17 NOTE — PROGRESS NOTE ADULT - SUBJECTIVE AND OBJECTIVE BOX
Gyn Progress Note POD #4    Subjective:     Pt seen and examined at bedside. Patient went to CT scan overnight to try to get CTA and IV blew, unable to get CT scan. Then refused having another IV placed for another attempt. states pain is well controlled. Patient ambulating. Passing flatus. Tolerating regular diet. Pt denies fever, chills, chest pain, SOB, nausea, vomiting, lightheadedness, dizziness, leg pain. States she wants to rest.    Objective:  T(F): 97.7, Max: 98.7 (06-16 @ 18:27)  HR: 86 (82 - 104)  BP: 164/94 (127/77 - 164/94)  RR: 18 (18 - 18)  SpO2: 99% (96% - 100%)  Wt(kg): --  I&O's Summary    I & Os for current day (as of 17 Jun 2017 09:08)  =============================================  IN: 1020 ml / OUT: 1350 ml / NET: -330 ml    CAPILLARY BLOOD GLUCOSE      MEDICATIONS  (STANDING):  simethicone 80milliGRAM(s) Chew three times a day  levothyroxine 100MICROGram(s) Oral daily  benzocaine 15 mG/menthol 3.6 mG Lozenge 2Lozenge Oral three times a day  nicotine - 21 mG/24Hr(s) Patch 1patch Transdermal daily  sodium chloride 0.9% lock flush 3milliLiter(s) IV Push every 8 hours  ibuprofen  Tablet 600milliGRAM(s) Oral every 6 hours  oxyCODONE IR 5milliGRAM(s) Oral every 4 hours  heparin  Injectable 5000Unit(s) SubCutaneous every 8 hours    MEDICATIONS  (PRN):  docusate sodium 100milliGRAM(s) Oral three times a day PRN Stool Softening      Physical Exam:  Constitutional: NAD, A+O x3  CV: RRR  Lungs: clear to auscultation bilaterally  Abdomen: soft,[] bowel sounds, appropriately tender, softly distended, no rebound or guarding  Incision: clean, dry, intact  Extremities: no lower extremity edema or calf tenderness bilaterally, venodynes in place    Labs:                        13.7   6.8   )-----------( 219      ( 17 Jun 2017 06:13 )             41.3                         14.3   7.8   )-----------( 209      ( 16 Jun 2017 11:52 )             44.0     06-16    140    |  98     |  8      ----------------------------<  94     4.3     |  30     |  0.61     Ca    9.8        16 Jun 2017 11:52    TPro  7.2    /  Alb  3.5    /  TBili  0.4    /  DBili  x      /  AST  26     /  ALT  22     /  AlkPhos  60     06-16              Assessment/Plan: 56y female POD# 4 , s/p Exploratory Laparotomy Bilateral Salpingo-oophorectomy for 15cm R adnexal cyst, with post operative course complication by oxygen desaturation, currently asymptomatic on 2L o2. Stable    CV: Hemodynamically stable  Pulm: Saturating well on 02. Increase incentive spirometry. appreciate pulm recommendations and input. Will attempt to wean patient off O2 today if pulm agrees. Patient has Echo WNL, CXR with bilateral pleural effusions status post lasix. Denies respiratory complaints. Talked to patient at length understands with long hx of smoking and surgery she is at risk for a PE, understands that a CTA will be the optimal test to rule out a embolus. currently refusing another IV to be placed or a CT scan to be done.   GI:  regular diet as tolerated  : voiding without difficulty   Heme: Continue HSQor DVT ppx. Increase OOB  Neuro: Continue oral meds for pain control

## 2017-06-18 LAB
ANION GAP SERPL CALC-SCNC: 13 MMOL/L — SIGNIFICANT CHANGE UP (ref 5–17)
BUN SERPL-MCNC: 14 MG/DL — SIGNIFICANT CHANGE UP (ref 7–23)
CALCIUM SERPL-MCNC: 9 MG/DL — SIGNIFICANT CHANGE UP (ref 8.4–10.5)
CHLORIDE SERPL-SCNC: 99 MMOL/L — SIGNIFICANT CHANGE UP (ref 96–108)
CO2 SERPL-SCNC: 27 MMOL/L — SIGNIFICANT CHANGE UP (ref 22–31)
CREAT SERPL-MCNC: 0.5 MG/DL — SIGNIFICANT CHANGE UP (ref 0.5–1.3)
GLUCOSE SERPL-MCNC: 91 MG/DL — SIGNIFICANT CHANGE UP (ref 70–99)
POTASSIUM SERPL-MCNC: 4.1 MMOL/L — SIGNIFICANT CHANGE UP (ref 3.5–5.3)
POTASSIUM SERPL-SCNC: 4.1 MMOL/L — SIGNIFICANT CHANGE UP (ref 3.5–5.3)
SODIUM SERPL-SCNC: 139 MMOL/L — SIGNIFICANT CHANGE UP (ref 135–145)

## 2017-06-18 PROCEDURE — 99233 SBSQ HOSP IP/OBS HIGH 50: CPT | Mod: GC

## 2017-06-18 RX ORDER — FUROSEMIDE 40 MG
20 TABLET ORAL ONCE
Qty: 0 | Refills: 0 | Status: COMPLETED | OUTPATIENT
Start: 2017-06-18 | End: 2017-06-18

## 2017-06-18 RX ADMIN — OXYCODONE HYDROCHLORIDE 5 MILLIGRAM(S): 5 TABLET ORAL at 21:44

## 2017-06-18 RX ADMIN — OXYCODONE HYDROCHLORIDE 5 MILLIGRAM(S): 5 TABLET ORAL at 14:37

## 2017-06-18 RX ADMIN — HEPARIN SODIUM 5000 UNIT(S): 5000 INJECTION INTRAVENOUS; SUBCUTANEOUS at 06:05

## 2017-06-18 RX ADMIN — OXYCODONE HYDROCHLORIDE 5 MILLIGRAM(S): 5 TABLET ORAL at 03:12

## 2017-06-18 RX ADMIN — Medication 600 MILLIGRAM(S): at 12:00

## 2017-06-18 RX ADMIN — SIMETHICONE 80 MILLIGRAM(S): 80 TABLET, CHEWABLE ORAL at 06:05

## 2017-06-18 RX ADMIN — Medication 1 PATCH: at 11:54

## 2017-06-18 RX ADMIN — Medication 600 MILLIGRAM(S): at 11:30

## 2017-06-18 RX ADMIN — Medication 600 MILLIGRAM(S): at 01:06

## 2017-06-18 RX ADMIN — Medication 20 MILLIGRAM(S): at 18:41

## 2017-06-18 RX ADMIN — SIMETHICONE 80 MILLIGRAM(S): 80 TABLET, CHEWABLE ORAL at 21:44

## 2017-06-18 RX ADMIN — Medication 600 MILLIGRAM(S): at 06:04

## 2017-06-18 RX ADMIN — OXYCODONE HYDROCHLORIDE 5 MILLIGRAM(S): 5 TABLET ORAL at 12:00

## 2017-06-18 RX ADMIN — OXYCODONE HYDROCHLORIDE 5 MILLIGRAM(S): 5 TABLET ORAL at 06:03

## 2017-06-18 RX ADMIN — OXYCODONE HYDROCHLORIDE 5 MILLIGRAM(S): 5 TABLET ORAL at 11:30

## 2017-06-18 RX ADMIN — Medication 1 PATCH: at 17:05

## 2017-06-18 RX ADMIN — Medication 100 MICROGRAM(S): at 21:44

## 2017-06-18 RX ADMIN — OXYCODONE HYDROCHLORIDE 5 MILLIGRAM(S): 5 TABLET ORAL at 02:12

## 2017-06-18 RX ADMIN — SODIUM CHLORIDE 3 MILLILITER(S): 9 INJECTION INTRAMUSCULAR; INTRAVENOUS; SUBCUTANEOUS at 21:43

## 2017-06-18 RX ADMIN — SODIUM CHLORIDE 3 MILLILITER(S): 9 INJECTION INTRAMUSCULAR; INTRAVENOUS; SUBCUTANEOUS at 14:40

## 2017-06-18 RX ADMIN — Medication 600 MILLIGRAM(S): at 18:41

## 2017-06-18 RX ADMIN — SODIUM CHLORIDE 3 MILLILITER(S): 9 INJECTION INTRAMUSCULAR; INTRAVENOUS; SUBCUTANEOUS at 06:13

## 2017-06-18 RX ADMIN — SIMETHICONE 80 MILLIGRAM(S): 80 TABLET, CHEWABLE ORAL at 14:28

## 2017-06-18 RX ADMIN — Medication 600 MILLIGRAM(S): at 19:10

## 2017-06-18 RX ADMIN — Medication 600 MILLIGRAM(S): at 07:04

## 2017-06-18 RX ADMIN — OXYCODONE HYDROCHLORIDE 5 MILLIGRAM(S): 5 TABLET ORAL at 15:07

## 2017-06-18 RX ADMIN — HEPARIN SODIUM 5000 UNIT(S): 5000 INJECTION INTRAVENOUS; SUBCUTANEOUS at 14:28

## 2017-06-18 RX ADMIN — OXYCODONE HYDROCHLORIDE 5 MILLIGRAM(S): 5 TABLET ORAL at 22:10

## 2017-06-18 RX ADMIN — OXYCODONE HYDROCHLORIDE 5 MILLIGRAM(S): 5 TABLET ORAL at 07:06

## 2017-06-18 RX ADMIN — Medication 600 MILLIGRAM(S): at 00:06

## 2017-06-18 NOTE — PROGRESS NOTE ADULT - PROBLEM SELECTOR PLAN 1
- CTA chest reviewed, prelim no PE, + emphysema.  - Scattered B lines seen on lung ultrasound bilaterally today; would give lasix 20 mg IV x 1.  - She is satting 95% on RA at rest, desats to 87% on RA with exertion.  Given her emphysema, she is likely nearing her baseline.  Goal O2 sat is >92%.  - Would c/w incentive spirometry, ambulate as tolerated  - would check ambulating oxygen saturation tomorrow on RA; if <88%, she will need home oxygen supplementation  - will need outpatient PFTs  - counseled patient on smoking cessation and what her emphysema means

## 2017-06-18 NOTE — PROGRESS NOTE ADULT - ATTENDING COMMENTS
56 year old woman s/p ex-lap, active smoker up to a pack a day found to have hypoxia improved with diuresis, CTA negative for PE showed centrilobular emphysema.    bedside ultrasound showed few b-lines, dose of diuretics today  will need outpatient PFTs  encourage incentive spirometry   please check room air saturation with ambulation

## 2017-06-18 NOTE — DISCHARGE NOTE ADULT - HOSPITAL COURSE
57 y/o s/p  exlap, Bilateral Salpingo-oophorectomy  2/2 15cm R adnexal cyst    EBL:100    HCT:45.9->41.6->41.0->38.3->44.0->41.3    .  See operative note for details of procedure.  POD#0, patient was advanced to a regular diet.  POD#1, may was discontinued and patient voided spontaneously.  Patient was placed on supplemental O2. Upon ambulation patient became hypoxic to 78%. Pulmonary followed patient and recommended CTA, Echo and CXR. Echo demonstrated diastolic dysfunction, CXR demonstrated bilateral pleural effusions, CTA was negative for PE. Patient was given Lasix 40 once. Her saturation improved on ambulation to 89%. CXR was performed 2 days later and demonstrated an improvement, with  small left pleural effusion. Patient was discharged on POD#5  in stable condition with adequate pain control, ambulating, tolerating PO and voiding spontaneously and saturation improvement. Patient to follow up in 2 weeks in clinic. She is also to follow up with pulmonology. 57 y/o s/p  exlap, Bilateral Salpingo-oophorectomy  2/2 15cm R adnexal cyst    EBL:100    HCT:45.9->41.6->41.0->38.3->44.0->41.3    .  See operative note for details of procedure.  POD#0, patient was advanced to a regular diet.  POD#1, may was discontinued and patient voided spontaneously.  Patient was placed on supplemental O2. Upon ambulation patient became hypoxic to 78%. Pulmonary followed patient and recommended CTA, Echo and CXR. Echo demonstrated diastolic dysfunction, CXR demonstrated bilateral pleural effusions, CTA was negative for PE. Patient was given Lasix 40 once. Her saturation improved on ambulation to 89%. CXR was performed 2 days later and demonstrated a  small left pleural effusion. Patient was discharged on POD#5  in stable condition with adequate pain control, ambulating, tolerating PO and voiding spontaneously and saturation improvement. Patient to follow up in 2 weeks in clinic. She is also to follow up with pulmonology. 57 y/o s/p  exlap, Bilateral Salpingo-oophorectomy  2/2 15cm R adnexal cyst    EBL:100    HCT:45.9->41.6->41.0->38.3->44.0->41.3    .  See operative note for details of procedure.  POD#0, patient was advanced to a regular diet.  POD#1, may was discontinued and patient voided spontaneously.  Patient was placed on supplemental O2. Upon ambulation patient became hypoxic to 78%. Pulmonary followed patient and recommended CTA, Echo and CXR. Echo demonstrated diastolic dysfunction, CXR demonstrated bilateral pleural effusions, CTA was negative for PE. Patient was given Lasix 40 IVP once. Her saturation improved on ambulation to 89%. CXR was performed 2 days later and demonstrated a  small left pleural effusion.  A CTPA on 6/17 revealed no PE but was consistent with emphysema.  Patient received Lasix 20 IVP on 6/18.    Patient was discharged on POD#6  in stable condition.  On day of discharge, patient had O2 sat of 90-92% without supplemental O2.  She had adequate pain control, ambulating, tolerating PO and voiding spontaneously and saturation improvement. Patient to follow up in 2 weeks in clinic. She is also to follow up with pulmonology.

## 2017-06-18 NOTE — DISCHARGE NOTE ADULT - MEDICATION SUMMARY - MEDICATIONS TO TAKE
I will START or STAY ON the medications listed below when I get home from the hospital:    ibuprofen 600 mg oral tablet  -- 1 tab(s) by mouth every 6 hours  -- Indication: For Pain    oxyCODONE 5 mg oral tablet  -- 1 tab(s) by mouth every 4 hours  -- Indication: For Pain    oxyCODONE 5 mg oral tablet  -- 1 tab(s) by mouth every 6 hours, As Needed -for severe pain MDD:4 tabs  -- Caution federal law prohibits the transfer of this drug to any person other  than the person for whom it was prescribed.  It is very important that you take or use this exactly as directed.  Do not skip doses or discontinue unless directed by your doctor.  May cause drowsiness.  Alcohol may intensify this effect.  Use care when operating dangerous machinery.  This prescription cannot be refilled.  Using more of this medication than prescribed may cause serious breathing problems.    -- Indication: For Severe pain    levothyroxine 100 mcg (0.1 mg) oral tablet  -- 1 tab(s) by mouth once a day  -- Indication: For Synthroid

## 2017-06-18 NOTE — PROGRESS NOTE ADULT - SUBJECTIVE AND OBJECTIVE BOX
CHIEF COMPLAINT: hypoxia    Interval Events:  Patient s/p CTA chest last night; prelim read, no PE. + emphysema.    Patient feels well from respiratory standpoint.  Denies sob or COOMBS. no wheezing.    REVIEW OF SYSTEMS:    CONSTITUTIONAL: No weakness, fevers or chills  EYES/ENT: No visual changes;  No vertigo, throat pain, or post-nasal drip; no nasal congestion  NECK: No pain or stiffness  RESPIRATORY: No cough, wheezing, hemoptysis; No shortness of breath  CARDIOVASCULAR: No chest pain or palpitations  GASTROINTESTINAL: Mild abdominal pain (2/2 binder). No nausea, vomiting, or hematemesis; No diarrhea or constipation. No melena or hematochezia.  GENITOURINARY: No dysuria, frequency or hematuria  NEUROLOGICAL: No numbness or weakness  SKIN: No itching, burning, rashes, or lesions   MSK: no back pain, myalgias, or arthralgias  ALLERGIC/IMMUNOLOGIC: no nasal discharge or itchy eyes    OBJECTIVE:  ICU Vital Signs Last 24 Hrs  T(C): 36.4, Max: 36.9 (06-18 @ 04:48)  T(F): 97.6, Max: 98.4 (06-18 @ 04:48)  HR: 86 (84 - 97)  BP: 146/92 (128/82 - 146/92)  RR: 16 (16 - 17)  SpO2: 97% (91% - 99%)      I & Os for 24h ending 06-18 @ 07:00  =============================================  IN: 1160 ml / OUT: 1900 ml / NET: -740 ml    I & Os for current day (as of 06-18 @ 14:42)  =============================================  IN: 740 ml / OUT: 400 ml / NET: 340 ml    CAPILLARY BLOOD GLUCOSE      PHYSICAL EXAM:  General: NAD  HEENT: normal oropharynx, PERRLA  Lymph Nodes: no cervical LAD  Neck: no JVD  Respiratory: CTAB, no w/r/r  Cardiovascular: RRR, nl s1/s2  Abdomen: + abdominal binder  Extremities: no peripheral edema, no cyanosis  Skin: no rashes  Neurological: no focal deficits  Psychiatry: normal affect    HOSPITAL MEDICATIONS:  heparin  Injectable 5000Unit(s) SubCutaneous every 8 hours    levothyroxine 100MICROGram(s) Oral daily    ibuprofen  Tablet 600milliGRAM(s) Oral every 6 hours  oxyCODONE IR 5milliGRAM(s) Oral every 4 hours    docusate sodium 100milliGRAM(s) Oral three times a day PRN  simethicone 80milliGRAM(s) Chew three times a day    benzocaine 15 mG/menthol 3.6 mG Lozenge 2Lozenge Oral three times a day    nicotine - 21 mG/24Hr(s) Patch 1patch Transdermal daily    LABS:                        13.7   6.8   )-----------( 219      ( 17 Jun 2017 06:13 )             41.3     Hgb Trend: 13.7<--, 14.3<--, 12.2<--, 13.5<--, 13.8    139  |  99  |  14  ----------------------------<  91  4.1   |  27  |  0.50    Ca    9.0      18 Jun 2017 09:06      Creatinine Trend: 0.50<--, 0.61<--, 0.73<--, 0.46<--, 0.72<--    RADIOLOGY:  [x] Reviewed and interpreted by me: prelim: CTA chest shows no PE, + emphysema

## 2017-06-18 NOTE — DISCHARGE NOTE ADULT - CARE PLAN
Principal Discharge DX:	Pelvic mass in female  Goal:	recovery  Instructions for follow-up, activity and diet:	nothing in vagina (sex, tampons, douching) no heavy lifting (>10 lbs) for 6 weeks. Please return to ER or call your doctors office if pain is worsening, you are unable to keep down food or drink, fever >101, heavy vaginal bleeding. You are able to take a shower regularly. Please make an appointment for 2 weeks with our clinic at [phone]. Principal Discharge DX:	Pelvic mass in female  Goal:	recovery  Instructions for follow-up, activity and diet:	nothing in vagina (sex, tampons, douching) no heavy lifting (>10 lbs) for 6 weeks. Please return to ER or call your doctors office if pain is worsening, you are unable to keep down food or drink, fever >101, heavy vaginal bleeding. You are able to take a shower regularly. Please make an appointment for 2 weeks with our clinic at [phone]. Principal Discharge DX:	Pelvic mass in female  Goal:	recovery  Instructions for follow-up, activity and diet:	nothing in vagina (sex, tampons, douching) no heavy lifting (>10 lbs) for 6 weeks. Please return to ER or call your doctors office if pain is worsening, you are unable to keep down food or drink, fever >101, heavy vaginal bleeding. You are able to take a shower regularly. Please make an appointment for 2 weeks with our clinic at [phone].  Secondary Diagnosis:	Hypoxia  Goal:	Wellness  Instructions for follow-up, activity and diet:	Please call pulmonology for a follow-up appointment in 1-2 weeks:  [phone]  [address] [place] [address] Principal Discharge DX:	Pelvic mass in female  Goal:	recovery  Instructions for follow-up, activity and diet:	nothing in vagina (sex, tampons, douching) no heavy lifting (>10 lbs) for 6 weeks. Please return to ER or call your doctors office if pain is worsening, you are unable to keep down food or drink, fever >101, heavy vaginal bleeding. You are able to take a shower regularly. Please make an appointment for 2 weeks with our clinic at [phone].  Secondary Diagnosis:	Hypoxia  Goal:	Wellness  Instructions for follow-up, activity and diet:	Please call pulmonology for a follow-up appointment in 1-2 weeks:  [phone]  [address] [place] [address]

## 2017-06-18 NOTE — DISCHARGE NOTE ADULT - PATIENT PORTAL LINK FT
“You can access the [facility] Patient Portal, offered by [facility], by registering with the following website: [email or web address]”

## 2017-06-18 NOTE — PROGRESS NOTE ADULT - SUBJECTIVE AND OBJECTIVE BOX
POD#5   HD#8    Patient seen and examined at bedside, no acute overnight events. No acute complaints, pain well controlled. Patient had CTA performed overnight.   Patient is ambulating, passing flatus, voiding spontaneously, and tolerating regular diet. Denies CP, SOB, N/V, fevers, and chills. Currently on 2L of O2.    Vital Signs Last 24 Hours  T(C): 36.9, Max: 36.9 (06-18 @ 04:48)  HR: 91 (84 - 97)  BP: 138/79 (114/79 - 138/79)  RR: 16 (16 - 18)  SpO2: 99% (87% - 99%)      I&O's Summary  I & Os for 24h ending 17 Jun 2017 07:00  =============================================  IN: 1020 ml / OUT: 1350 ml / NET: -330 ml    I & Os for current day (as of 18 Jun 2017 06:38)  =============================================  IN: 1160 ml / OUT: 1900 ml / NET: -740 ml      Physical Exam:  General: NAD  CV: NR, RR, S1, S2, no M/R/G  Lungs: left lung base crackles  Abdomen: Soft, non-tender, non-distended, +BS  Incision: vertical CDI  Ext: No pain or swelling    Labs:                        13.7   6.8   )-----------( 219      ( 17 Jun 2017 06:13 )             41.3   baso x      eos x      imm gran x      lymph x      mono x      poly x                            14.3   7.8   )-----------( 209      ( 16 Jun 2017 11:52 )             44.0   baso x      eos x      imm gran x      lymph x      mono x      poly x                            12.2   8.0   )-----------( 164      ( 15 Chetan 2017 06:50 )             38.3   baso x      eos x      imm gran x      lymph x      mono x      poly x          MEDICATIONS  (STANDING):  simethicone 80milliGRAM(s) Chew three times a day  levothyroxine 100MICROGram(s) Oral daily  benzocaine 15 mG/menthol 3.6 mG Lozenge 2Lozenge Oral three times a day  nicotine - 21 mG/24Hr(s) Patch 1patch Transdermal daily  sodium chloride 0.9% lock flush 3milliLiter(s) IV Push every 8 hours  ibuprofen  Tablet 600milliGRAM(s) Oral every 6 hours  heparin  Injectable 5000Unit(s) SubCutaneous every 8 hours  oxyCODONE IR 5milliGRAM(s) Oral every 4 hours    MEDICATIONS  (PRN):  docusate sodium 100milliGRAM(s) Oral three times a day PRN Stool Softening

## 2017-06-18 NOTE — DISCHARGE NOTE ADULT - PLAN OF CARE
recovery nothing in vagina (sex, tampons, douching) no heavy lifting (>10 lbs) for 6 weeks. Please return to ER or call your doctors office if pain is worsening, you are unable to keep down food or drink, fever >101, heavy vaginal bleeding. You are able to take a shower regularly. Please make an appointment for 2 weeks with our clinic at [phone]. Wellness Please call pulmonology for a follow-up appointment in 1-2 weeks:  [phone]  [address]  [place], [place]

## 2017-06-18 NOTE — PROGRESS NOTE ADULT - PROBLEM SELECTOR PLAN 1
Neuro: c/w po pain meds  CV: Hemodynamically stable  Pulm: Saturating well on 2L of O2. Upon ambulation yesterday, patient saturation decreased to 89% which is an improvement since Friday. Patient had CTA performed last night. Prelim read was negative for PE by radiologist over the phone. Awaiting final read. No dopplers needed at this point. Will await Pulm recs this AM. For possible removal of supplemental O2.  GI: c/w regular diet  : voiding spontaneously  Heme: c/w HSQ and SCDs for DVT ppx  Dispo: Continue routine post-op care    KAVEH Ramey, PGY2  #[id number]

## 2017-06-18 NOTE — DISCHARGE NOTE ADULT - NS AS ACTIVITY OBS
Do not drive or operate machinery/Showering allowed/Sex allowed/Stairs allowed/No Heavy lifting/straining/Return to Work/School allowed

## 2017-06-19 VITALS
OXYGEN SATURATION: 93 % | DIASTOLIC BLOOD PRESSURE: 84 MMHG | TEMPERATURE: 98 F | RESPIRATION RATE: 18 BRPM | SYSTOLIC BLOOD PRESSURE: 133 MMHG | HEART RATE: 84 BPM

## 2017-06-19 LAB
HCT VFR BLD CALC: 42.5 % — SIGNIFICANT CHANGE UP (ref 34.5–45)
HGB BLD-MCNC: 13.4 G/DL — SIGNIFICANT CHANGE UP (ref 11.5–15.5)
MCHC RBC-ENTMCNC: 30.6 PG — SIGNIFICANT CHANGE UP (ref 27–34)
MCHC RBC-ENTMCNC: 31.6 GM/DL — LOW (ref 32–36)
MCV RBC AUTO: 96.9 FL — SIGNIFICANT CHANGE UP (ref 80–100)
PLATELET # BLD AUTO: 235 K/UL — SIGNIFICANT CHANGE UP (ref 150–400)
RBC # BLD: 4.39 M/UL — SIGNIFICANT CHANGE UP (ref 3.8–5.2)
RBC # FLD: 12.5 % — SIGNIFICANT CHANGE UP (ref 10.3–14.5)
WBC # BLD: 6.6 K/UL — SIGNIFICANT CHANGE UP (ref 3.8–10.5)
WBC # FLD AUTO: 6.6 K/UL — SIGNIFICANT CHANGE UP (ref 3.8–10.5)

## 2017-06-19 PROCEDURE — 88175 CYTOPATH C/V AUTO FLUID REDO: CPT

## 2017-06-19 PROCEDURE — 86301 IMMUNOASSAY TUMOR CA 19-9: CPT

## 2017-06-19 PROCEDURE — 86703 HIV-1/HIV-2 1 RESULT ANTBDY: CPT

## 2017-06-19 PROCEDURE — 86850 RBC ANTIBODY SCREEN: CPT

## 2017-06-19 PROCEDURE — 85610 PROTHROMBIN TIME: CPT

## 2017-06-19 PROCEDURE — 87624 HPV HI-RISK TYP POOLED RSLT: CPT

## 2017-06-19 PROCEDURE — 99285 EMERGENCY DEPT VISIT HI MDM: CPT | Mod: 25

## 2017-06-19 PROCEDURE — 82947 ASSAY GLUCOSE BLOOD QUANT: CPT

## 2017-06-19 PROCEDURE — 87522 HEPATITIS C REVRS TRNSCRPJ: CPT

## 2017-06-19 PROCEDURE — 82803 BLOOD GASES ANY COMBINATION: CPT

## 2017-06-19 PROCEDURE — 83615 LACTATE (LD) (LDH) ENZYME: CPT

## 2017-06-19 PROCEDURE — 82435 ASSAY OF BLOOD CHLORIDE: CPT

## 2017-06-19 PROCEDURE — 80048 BASIC METABOLIC PNL TOTAL CA: CPT

## 2017-06-19 PROCEDURE — 86901 BLOOD TYPING SEROLOGIC RH(D): CPT

## 2017-06-19 PROCEDURE — 85027 COMPLETE CBC AUTOMATED: CPT

## 2017-06-19 PROCEDURE — 85730 THROMBOPLASTIN TIME PARTIAL: CPT

## 2017-06-19 PROCEDURE — 93306 TTE W/DOPPLER COMPLETE: CPT

## 2017-06-19 PROCEDURE — 80053 COMPREHEN METABOLIC PANEL: CPT

## 2017-06-19 PROCEDURE — 86706 HEP B SURFACE ANTIBODY: CPT

## 2017-06-19 PROCEDURE — 82330 ASSAY OF CALCIUM: CPT

## 2017-06-19 PROCEDURE — 36600 WITHDRAWAL OF ARTERIAL BLOOD: CPT

## 2017-06-19 PROCEDURE — 88331 PATH CONSLTJ SURG 1 BLK 1SPC: CPT

## 2017-06-19 PROCEDURE — 71275 CT ANGIOGRAPHY CHEST: CPT

## 2017-06-19 PROCEDURE — 81025 URINE PREGNANCY TEST: CPT

## 2017-06-19 PROCEDURE — 86803 HEPATITIS C AB TEST: CPT

## 2017-06-19 PROCEDURE — 84295 ASSAY OF SERUM SODIUM: CPT

## 2017-06-19 PROCEDURE — 88307 TISSUE EXAM BY PATHOLOGIST: CPT

## 2017-06-19 PROCEDURE — 86900 BLOOD TYPING SEROLOGIC ABO: CPT

## 2017-06-19 PROCEDURE — 83520 IMMUNOASSAY QUANT NOS NONAB: CPT

## 2017-06-19 PROCEDURE — 88112 CYTOPATH CELL ENHANCE TECH: CPT

## 2017-06-19 PROCEDURE — 71045 X-RAY EXAM CHEST 1 VIEW: CPT

## 2017-06-19 PROCEDURE — C1889: CPT

## 2017-06-19 PROCEDURE — 96374 THER/PROPH/DIAG INJ IV PUSH: CPT

## 2017-06-19 PROCEDURE — 86780 TREPONEMA PALLIDUM: CPT

## 2017-06-19 PROCEDURE — 85014 HEMATOCRIT: CPT

## 2017-06-19 PROCEDURE — 88305 TISSUE EXAM BY PATHOLOGIST: CPT

## 2017-06-19 PROCEDURE — 84132 ASSAY OF SERUM POTASSIUM: CPT

## 2017-06-19 PROCEDURE — 83605 ASSAY OF LACTIC ACID: CPT

## 2017-06-19 PROCEDURE — 84704 HCG FREE BETACHAIN TEST: CPT

## 2017-06-19 RX ORDER — OXYCODONE HYDROCHLORIDE 5 MG/1
1 TABLET ORAL
Qty: 15 | Refills: 0 | OUTPATIENT
Start: 2017-06-19

## 2017-06-19 RX ORDER — OXYCODONE HYDROCHLORIDE 5 MG/1
1 TABLET ORAL
Qty: 0 | Refills: 0 | COMMUNITY
Start: 2017-06-19

## 2017-06-19 RX ORDER — IBUPROFEN 200 MG
1 TABLET ORAL
Qty: 0 | Refills: 0 | COMMUNITY
Start: 2017-06-19

## 2017-06-19 RX ADMIN — SODIUM CHLORIDE 3 MILLILITER(S): 9 INJECTION INTRAMUSCULAR; INTRAVENOUS; SUBCUTANEOUS at 05:20

## 2017-06-19 RX ADMIN — OXYCODONE HYDROCHLORIDE 5 MILLIGRAM(S): 5 TABLET ORAL at 05:29

## 2017-06-19 RX ADMIN — Medication 1 PATCH: at 12:24

## 2017-06-19 RX ADMIN — OXYCODONE HYDROCHLORIDE 5 MILLIGRAM(S): 5 TABLET ORAL at 17:32

## 2017-06-19 RX ADMIN — Medication 100 MILLIGRAM(S): at 17:32

## 2017-06-19 RX ADMIN — Medication 600 MILLIGRAM(S): at 06:00

## 2017-06-19 RX ADMIN — OXYCODONE HYDROCHLORIDE 5 MILLIGRAM(S): 5 TABLET ORAL at 13:51

## 2017-06-19 RX ADMIN — Medication 600 MILLIGRAM(S): at 05:28

## 2017-06-19 RX ADMIN — SIMETHICONE 80 MILLIGRAM(S): 80 TABLET, CHEWABLE ORAL at 13:28

## 2017-06-19 RX ADMIN — SIMETHICONE 80 MILLIGRAM(S): 80 TABLET, CHEWABLE ORAL at 05:29

## 2017-06-19 RX ADMIN — OXYCODONE HYDROCHLORIDE 5 MILLIGRAM(S): 5 TABLET ORAL at 10:35

## 2017-06-19 RX ADMIN — OXYCODONE HYDROCHLORIDE 5 MILLIGRAM(S): 5 TABLET ORAL at 10:07

## 2017-06-19 RX ADMIN — OXYCODONE HYDROCHLORIDE 5 MILLIGRAM(S): 5 TABLET ORAL at 13:28

## 2017-06-19 RX ADMIN — Medication 1 PATCH: at 13:28

## 2017-06-19 RX ADMIN — OXYCODONE HYDROCHLORIDE 5 MILLIGRAM(S): 5 TABLET ORAL at 01:45

## 2017-06-19 RX ADMIN — Medication 600 MILLIGRAM(S): at 11:44

## 2017-06-19 RX ADMIN — SODIUM CHLORIDE 3 MILLILITER(S): 9 INJECTION INTRAMUSCULAR; INTRAVENOUS; SUBCUTANEOUS at 13:31

## 2017-06-19 RX ADMIN — Medication 600 MILLIGRAM(S): at 11:17

## 2017-06-19 RX ADMIN — OXYCODONE HYDROCHLORIDE 5 MILLIGRAM(S): 5 TABLET ORAL at 02:30

## 2017-06-19 RX ADMIN — OXYCODONE HYDROCHLORIDE 5 MILLIGRAM(S): 5 TABLET ORAL at 06:00

## 2017-06-19 RX ADMIN — Medication 600 MILLIGRAM(S): at 01:43

## 2017-06-19 RX ADMIN — Medication 600 MILLIGRAM(S): at 02:30

## 2017-06-19 RX ADMIN — Medication 600 MILLIGRAM(S): at 17:33

## 2017-06-19 NOTE — PROGRESS NOTE ADULT - PROBLEM SELECTOR PROBLEM 1
Pelvic mass in female
Status post exploratory laparotomy
Status post exploratory laparotomy
Hypoxia
Hypoxia
Other secondary hypertension
Other secondary hypertension
Pelvic mass in female
Status post exploratory laparotomy
Status post exploratory laparotomy

## 2017-06-19 NOTE — PROGRESS NOTE ADULT - ASSESSMENT
57 y/o F hypothyroidism (Grave's disease s/p GRIMALDO), current smoker, p/w RLQ pain, found to have ovarian mass s/p ex-lap and resection, now hypoxic post-op, unclear etiology.
57y/o POD#0 s/p BSO, D&C in stable condition.
55 y/o F hypothyroidism (Grave's disease s/p GRIMALDO), current smoker, p/w RLQ pain, found to have ovarian mass s/p ex-lap and resection, now hypoxic post-op, unclear etiology.
55 yo HD#3 a/w 15 cm pelvic mass and abdominal pain, for OR today
55 yo HD#5 a/w 15 cm pelvic mass and abdominal pain, now POD#2 status post ExLap Bilateral salpingo-oophorectomy D+C (Frozen=benign for pelvic mass and endometrial curetting), EBL: 100 - stable
55 yo HD#6 a/w 15 cm pelvic mass and abdominal pain, now POD#3 status post Ex-Lap Bilateral salpingo-oophorectomy D+C (Frozen=benign for pelvic mass and endometrial curetting), EBL: 100. As per pulmonary note, attempted to obtain CT Angio today. Pt needed a 20 gauge needle and IV team attempted to place IV twice. One IV was deemed appropriate and pt was taken to CT. Upon getting to CT, when the contrast when into vein, the IV infiltrated and pt was unable to undergo CT Angio. Pt refused to have another IV placed at that time and requested to go up to her room and eat. Pt was counseled on the importance of getting a CT Angio to r/o a PE. Pt underwent a TTE to r/o cardio-pulmonary sources of hypoxia. Went to discuss with patient the importance of obtaining a CT Angio to r/o PE. Pt will only have another IV placed if it is under ultrasound guidance. Will attempt to call SICU and MICU team overnight, given that currently they are too busy with critical patients to place an IV. Pt instructed that she cannot go home until she has a CT Angio. Pt currently saturating >95% on 2L NC. Pt denies cp, sob. Will continue to monitor overnight. Will touch base w/ pulmonary once CT Angio and echo are read.   eleonora Mccauley
56F w/Graves disease s/p GRIMALDO now with hypothyroidism p/w RLQ pain and vaginal spotting found to have large ovarian mass now s/p ex-lap and resection medicine following for hypertension.
56f PMH hypothyroidism presents with 15cm adnexal mass c/b hypertension
56y female POD# 4 , s/p Exploratory Laparotomy Bilateral Salpingo-oophorectomy for 15cm R adnexal cyst, with post operative course complication by oxygen desaturation, currently asymptomatic on 2L o2. Stable
57 yo HD#2 a/w 15 cm pelvic mass and abdominal pain
57 yo HD#4 a/w 15 cm pelvic mass and abdominal pain, now status post ExLap Bilateral salpingo-oophorectomy D+C (Frozen=benign for pelvic mass and endometrial curetting), EBL: 100 - stable
57 yo HD#6 a/w 15 cm pelvic mass and abdominal pain, now POD#3 status post ExLap Bilateral salpingo-oophorectomy D+C (Frozen=benign for pelvic mass and endometrial curetting), EBL: 100. Now with varying O2 saturations, w/bilateral pleural effusions. Currently asymptomatic.
57 yo HD#8, a/w 15 cm pelvic mass and abdominal pain, now POD#5 status post ExLap Bilateral salpingo-oophorectomy D+C (Frozen=benign for pelvic mass and endometrial curetting), EBL: 100. Currently asymptomatic.
57y/o POD#0 s/p ex lap, BSO, D&C in stable condition.  Neuro: c/w PCA, toradol PRN for pain control, will transition to oral meds in AM  CV: Hemodynamically stable, if hypertensive, will administer hydralazine   Pulm: Saturating well on room air, encourage incentive spirometry  GI: Advance to regular diet  : UOP adequate  Heme: c/w HSQ and SCDs for DVT ppx  Endo: c/w synthroid for hypothyroid  Dispo: Continue routine post-op care
AP 56 year old POD6 s/p exlap, BSO, and D+C desaturating on room air likely 2/2 emphysema

## 2017-06-19 NOTE — PROGRESS NOTE ADULT - PROBLEM SELECTOR PLAN 1
Neuro: cw Analgesia prn  CV: hemodynamically stable  Pulm: Appreciate pulm reccs.  Will wean off supp O2 and discharge home on O2 if desaturation <88% with ambulation  GI: cw reg diet   voiding adequately  Heme: HSQ, OOB for DVT ppx  Dispo: Discharge home with home O2 if needed.

## 2017-06-19 NOTE — PROGRESS NOTE ADULT - ATTENDING COMMENTS
GYN Attending Note    Pt seen and examined. Agree with above note. Pt has remained admitted due to pulm issues (desat with ambulation off O2). Pt to be evaluated by pulmonology and will receive final recs regarding O2 supplementation. Plan for discharge home today.     KAVEH Gastelum

## 2017-06-19 NOTE — PROGRESS NOTE ADULT - SUBJECTIVE AND OBJECTIVE BOX
Spoke to patient regarding her declining HSQ, as well as vital signs.    Pt reports that she is "black and blue" on her arm from the heparin yesterday, that her daughter (a radiologist) saw her arm and as she is likely going home tomorrow, recommended that she decline further heparin. The patient states she ambulated 5 times over the course of the day, all around the hospital floor.     After discussing with the patient the risks/benefits of heparin, she still declined treatment. She states she will continue ambulating frequently and wear Venodyne boots when in bed for DVT prophylaxis.     She states she declines vital sign monitoring as she was tired and wanted to sleep, but is now amenable to taking her vital signs. Will take her vitals now.    d/w Dr. Parisa Solomon PGY2

## 2017-06-19 NOTE — PROVIDER CONTACT NOTE (OTHER) - ASSESSMENT
Sitting on 2L supp o2 : 96%  Ambulating w 2L n/c in place: 95%  Sitting with room air: 91%  Ambulating on room air: 90%
Pt A&Ox4, VSS, pt ambulating unit x5 during day today with portable supplemental O2, pt is POD5, refusing SubQ Heparin, states "They told me I'm done with that, I'm not doing that, I mean just look at my arms, have you seen my arms?" Pt has significant ecchymosis to posterior upper arms.
o2 sat 85-87%%

## 2017-06-19 NOTE — PROVIDER CONTACT NOTE (OTHER) - SITUATION
Patient has No IV access but refused to insert new IV
Pt refusing SubQ Heparin
pt o2 wemarily attempted
pt sleeping, on room air. CPox in place, sat 85% while asleep.

## 2017-06-19 NOTE — PROVIDER CONTACT NOTE (OTHER) - ACTION/TREATMENT ORDERED:
Wait untill tomorrow
MD Solomon aware, team will speak with patient in the AM regarding VTE PPX importance and management
ok with saturation status, pt w known etiology and will follow up outpatient
will consult w pulmonology

## 2017-06-19 NOTE — PROGRESS NOTE ADULT - PROVIDER SPECIALTY LIST ADULT
Anesthesia
GYN
Hospitalist
Internal Medicine
Pulmonology
GYN
Pulmonology

## 2017-06-19 NOTE — PROGRESS NOTE ADULT - SUBJECTIVE AND OBJECTIVE BOX
Pt seen and examined at bedside. Pain is well-controlled. Pt is ambulating, tolerating reg diet, passing flatus, [+]BM, and urinating adequately.   Pt denies fever, chills, chest pain, SOB, nausea, vomiting, lightheadedness, dizziness.      T(F): 98, Max: 98.2 (06-19 @ 01:40)  HR: 82 (82 - 97)  BP: 127/84 (127/84 - 154/90)  RR: 16 (16 - 18)  SpO2: 98% (96% - 98%)  Wt(kg): --  I&O's Summary  I & Os for 24h ending 18 Jun 2017 07:00  =============================================  IN: 1160 ml / OUT: 1900 ml / NET: -740 ml    I & Os for current day (as of 19 Jun 2017 06:46)  =============================================  IN: 1340 ml / OUT: 400 ml / NET: 940 ml      MEDICATIONS  (STANDING):  simethicone 80milliGRAM(s) Chew three times a day  levothyroxine 100MICROGram(s) Oral daily  benzocaine 15 mG/menthol 3.6 mG Lozenge 2Lozenge Oral three times a day  nicotine - 21 mG/24Hr(s) Patch 1patch Transdermal daily  sodium chloride 0.9% lock flush 3milliLiter(s) IV Push every 8 hours  ibuprofen  Tablet 600milliGRAM(s) Oral every 6 hours  heparin  Injectable 5000Unit(s) SubCutaneous every 8 hours  oxyCODONE IR 5milliGRAM(s) Oral every 4 hours    MEDICATIONS  (PRN):  docusate sodium 100milliGRAM(s) Oral three times a day PRN Stool Softening      Physical Exam:  Constitutional: NAD  Pulmonary: expiratory wheezes bilaterally   Cardiovascular: Regular rate and rhythm   Abdomen: incision site clean, dry, intact. Soft, mildly tender, [] distended, no guarding, no rebound, [] bowel sounds  Extremities: no lower extremity edema or calve tenderness. SCDs in place     LABS:                        13.7   6.8   )-----------( 219      ( 17 Jun 2017 06:13 )             41.3   baso x      eos x      imm gran x      lymph x      mono x      poly x                            14.3   7.8   )-----------( 209      ( 16 Jun 2017 11:52 )             44.0   baso x      eos x      imm gran x      lymph x      mono x      poly x        06-18 @ 09:06    139  |  99  |  14  ----------------------------<  91  4.1   |  27  |  0.50    06-16 @ 11:52    140  |  98  |  8   ----------------------------<  94  4.3   |  30  |  0.61        TPro  7.2  /  Alb  3.5  /  TBili  0.4  /  DBili  x   /  AST  26  /  ALT  22  /  AlkPhos  60  06-16 @ 11:52

## 2017-06-19 NOTE — PROVIDER CONTACT NOTE (OTHER) - BACKGROUND
Pt is s/p Lap to open BSO w/ D&C, POD5, ambulatory independently w/ portable O2
see h/p
not on supp o2 at home, has been since sx in hospital. HX of currently smoking

## 2017-06-28 PROBLEM — Z00.00 ENCOUNTER FOR PREVENTIVE HEALTH EXAMINATION: Status: ACTIVE | Noted: 2017-06-28

## 2017-06-29 ENCOUNTER — APPOINTMENT (OUTPATIENT)
Dept: OBGYN | Facility: CLINIC | Age: 57
End: 2017-06-29

## 2017-06-29 ENCOUNTER — OUTPATIENT (OUTPATIENT)
Dept: OUTPATIENT SERVICES | Facility: HOSPITAL | Age: 57
LOS: 1 days | End: 2017-06-29
Payer: COMMERCIAL

## 2017-06-29 VITALS
HEIGHT: 65 IN | WEIGHT: 156.38 LBS | BODY MASS INDEX: 26.05 KG/M2 | SYSTOLIC BLOOD PRESSURE: 132 MMHG | DIASTOLIC BLOOD PRESSURE: 90 MMHG

## 2017-06-29 DIAGNOSIS — N76.0 ACUTE VAGINITIS: ICD-10-CM

## 2017-06-29 PROCEDURE — G0463: CPT

## 2017-07-10 DIAGNOSIS — Z98.890 OTHER SPECIFIED POSTPROCEDURAL STATES: ICD-10-CM

## 2017-07-27 ENCOUNTER — OUTPATIENT (OUTPATIENT)
Dept: OUTPATIENT SERVICES | Facility: HOSPITAL | Age: 57
LOS: 1 days | End: 2017-07-27
Payer: COMMERCIAL

## 2017-07-27 ENCOUNTER — APPOINTMENT (OUTPATIENT)
Dept: OBGYN | Facility: CLINIC | Age: 57
End: 2017-07-27
Payer: MEDICAID

## 2017-07-27 VITALS
DIASTOLIC BLOOD PRESSURE: 80 MMHG | SYSTOLIC BLOOD PRESSURE: 120 MMHG | HEIGHT: 65 IN | BODY MASS INDEX: 25.99 KG/M2 | WEIGHT: 156 LBS

## 2017-07-27 DIAGNOSIS — N76.0 ACUTE VAGINITIS: ICD-10-CM

## 2017-07-27 PROCEDURE — 99024 POSTOP FOLLOW-UP VISIT: CPT | Mod: GC

## 2017-07-27 PROCEDURE — G0463: CPT

## 2017-08-02 DIAGNOSIS — Z98.890 OTHER SPECIFIED POSTPROCEDURAL STATES: ICD-10-CM

## 2017-09-28 ENCOUNTER — EMERGENCY (EMERGENCY)
Facility: HOSPITAL | Age: 57
LOS: 0 days | Discharge: HOME | End: 2017-09-28
Attending: EMERGENCY MEDICINE | Admitting: EMERGENCY MEDICINE
Payer: COMMERCIAL

## 2017-09-28 VITALS
HEART RATE: 90 BPM | TEMPERATURE: 98 F | SYSTOLIC BLOOD PRESSURE: 154 MMHG | DIASTOLIC BLOOD PRESSURE: 96 MMHG | OXYGEN SATURATION: 97 % | RESPIRATION RATE: 16 BRPM

## 2017-09-28 VITALS — HEIGHT: 64 IN | WEIGHT: 156.09 LBS

## 2017-09-28 DIAGNOSIS — K52.9 NONINFECTIVE GASTROENTERITIS AND COLITIS, UNSPECIFIED: ICD-10-CM

## 2017-09-28 DIAGNOSIS — R10.31 RIGHT LOWER QUADRANT PAIN: ICD-10-CM

## 2017-09-28 DIAGNOSIS — F17.210 NICOTINE DEPENDENCE, CIGARETTES, UNCOMPLICATED: ICD-10-CM

## 2017-09-28 LAB
ALBUMIN SERPL ELPH-MCNC: 3.9 G/DL — SIGNIFICANT CHANGE UP (ref 3.3–5)
ALP SERPL-CCNC: 92 U/L — SIGNIFICANT CHANGE UP (ref 40–120)
ALT FLD-CCNC: 26 U/L — SIGNIFICANT CHANGE UP (ref 12–78)
ANION GAP SERPL CALC-SCNC: 7 MMOL/L — SIGNIFICANT CHANGE UP (ref 5–17)
APPEARANCE UR: CLEAR — SIGNIFICANT CHANGE UP
APTT BLD: 31.6 SEC — SIGNIFICANT CHANGE UP (ref 27.5–37.4)
AST SERPL-CCNC: 18 U/L — SIGNIFICANT CHANGE UP (ref 15–37)
BACTERIA # UR AUTO: (no result)
BASOPHILS # BLD AUTO: 0.1 K/UL — SIGNIFICANT CHANGE UP (ref 0–0.2)
BASOPHILS NFR BLD AUTO: 0.4 % — SIGNIFICANT CHANGE UP (ref 0–2)
BILIRUB SERPL-MCNC: 0.5 MG/DL — SIGNIFICANT CHANGE UP (ref 0.2–1.2)
BILIRUB UR-MCNC: NEGATIVE — SIGNIFICANT CHANGE UP
BUN SERPL-MCNC: 15 MG/DL — SIGNIFICANT CHANGE UP (ref 7–23)
CALCIUM SERPL-MCNC: 9.4 MG/DL — SIGNIFICANT CHANGE UP (ref 8.5–10.1)
CHLORIDE SERPL-SCNC: 101 MMOL/L — SIGNIFICANT CHANGE UP (ref 96–108)
CO2 SERPL-SCNC: 27 MMOL/L — SIGNIFICANT CHANGE UP (ref 22–31)
COLOR SPEC: YELLOW — SIGNIFICANT CHANGE UP
COMMENT - URINE: SIGNIFICANT CHANGE UP
CREAT SERPL-MCNC: 0.75 MG/DL — SIGNIFICANT CHANGE UP (ref 0.5–1.3)
DIFF PNL FLD: (no result)
EOSINOPHIL # BLD AUTO: 0 K/UL — SIGNIFICANT CHANGE UP (ref 0–0.5)
EOSINOPHIL NFR BLD AUTO: 0.1 % — SIGNIFICANT CHANGE UP (ref 0–6)
EPI CELLS # UR: (no result)
GLUCOSE SERPL-MCNC: 113 MG/DL — HIGH (ref 70–99)
GLUCOSE UR QL: NEGATIVE MG/DL — SIGNIFICANT CHANGE UP
HCT VFR BLD CALC: 52.6 % — HIGH (ref 34.5–45)
HGB BLD-MCNC: 17.3 G/DL — HIGH (ref 11.5–15.5)
INR BLD: 1.08 RATIO — SIGNIFICANT CHANGE UP (ref 0.88–1.16)
KETONES UR-MCNC: (no result)
LEUKOCYTE ESTERASE UR-ACNC: (no result)
LYMPHOCYTES # BLD AUTO: 15.4 % — SIGNIFICANT CHANGE UP (ref 13–44)
LYMPHOCYTES # BLD AUTO: 2.3 K/UL — SIGNIFICANT CHANGE UP (ref 1–3.3)
MCHC RBC-ENTMCNC: 30.6 PG — SIGNIFICANT CHANGE UP (ref 27–34)
MCHC RBC-ENTMCNC: 33 GM/DL — SIGNIFICANT CHANGE UP (ref 32–36)
MCV RBC AUTO: 92.9 FL — SIGNIFICANT CHANGE UP (ref 80–100)
MONOCYTES # BLD AUTO: 0.4 K/UL — SIGNIFICANT CHANGE UP (ref 0–0.9)
MONOCYTES NFR BLD AUTO: 3 % — SIGNIFICANT CHANGE UP (ref 2–14)
NEUTROPHILS # BLD AUTO: 11.8 K/UL — HIGH (ref 1.8–7.4)
NEUTROPHILS NFR BLD AUTO: 81.1 % — HIGH (ref 43–77)
NITRITE UR-MCNC: NEGATIVE — SIGNIFICANT CHANGE UP
PH UR: 5 — SIGNIFICANT CHANGE UP (ref 5–8)
PLATELET # BLD AUTO: 243 K/UL — SIGNIFICANT CHANGE UP (ref 150–400)
POTASSIUM SERPL-MCNC: 4.4 MMOL/L — SIGNIFICANT CHANGE UP (ref 3.5–5.3)
POTASSIUM SERPL-SCNC: 4.4 MMOL/L — SIGNIFICANT CHANGE UP (ref 3.5–5.3)
PROT SERPL-MCNC: 8.4 GM/DL — HIGH (ref 6–8.3)
PROT UR-MCNC: 30 MG/DL
PROTHROM AB SERPL-ACNC: 11.7 SEC — SIGNIFICANT CHANGE UP (ref 9.8–12.7)
RBC # BLD: 5.66 M/UL — HIGH (ref 3.8–5.2)
RBC # FLD: 11.9 % — SIGNIFICANT CHANGE UP (ref 10.3–14.5)
RBC CASTS # UR COMP ASSIST: (no result) /HPF (ref 0–4)
SODIUM SERPL-SCNC: 135 MMOL/L — SIGNIFICANT CHANGE UP (ref 135–145)
SP GR SPEC: 1.02 — SIGNIFICANT CHANGE UP (ref 1.01–1.02)
UROBILINOGEN FLD QL: NEGATIVE MG/DL — SIGNIFICANT CHANGE UP
WBC # BLD: 14.6 K/UL — HIGH (ref 3.8–10.5)
WBC # FLD AUTO: 14.6 K/UL — HIGH (ref 3.8–10.5)
WBC UR QL: SIGNIFICANT CHANGE UP

## 2017-09-28 PROCEDURE — 74177 CT ABD & PELVIS W/CONTRAST: CPT | Mod: 26

## 2017-09-28 PROCEDURE — 99284 EMERGENCY DEPT VISIT MOD MDM: CPT

## 2017-09-28 RX ORDER — MOXIFLOXACIN HYDROCHLORIDE TABLETS, 400 MG 400 MG/1
1 TABLET, FILM COATED ORAL
Qty: 20 | Refills: 0 | OUTPATIENT
Start: 2017-09-28 | End: 2017-10-08

## 2017-09-28 RX ORDER — CIPROFLOXACIN LACTATE 400MG/40ML
500 VIAL (ML) INTRAVENOUS ONCE
Qty: 0 | Refills: 0 | Status: COMPLETED | OUTPATIENT
Start: 2017-09-28 | End: 2017-09-28

## 2017-09-28 RX ORDER — METRONIDAZOLE 500 MG
1 TABLET ORAL
Qty: 21 | Refills: 0 | OUTPATIENT
Start: 2017-09-28 | End: 2017-10-05

## 2017-09-28 RX ORDER — METRONIDAZOLE 500 MG
500 TABLET ORAL ONCE
Qty: 0 | Refills: 0 | Status: COMPLETED | OUTPATIENT
Start: 2017-09-28 | End: 2017-09-28

## 2017-09-28 RX ORDER — MOXIFLOXACIN HYDROCHLORIDE TABLETS, 400 MG 400 MG/1
1 TABLET, FILM COATED ORAL
Qty: 14 | Refills: 0 | OUTPATIENT
Start: 2017-09-28 | End: 2017-10-05

## 2017-09-28 RX ORDER — METRONIDAZOLE 500 MG
1 TABLET ORAL
Qty: 30 | Refills: 0 | OUTPATIENT
Start: 2017-09-28 | End: 2017-10-08

## 2017-09-28 RX ORDER — SODIUM CHLORIDE 9 MG/ML
1000 INJECTION INTRAMUSCULAR; INTRAVENOUS; SUBCUTANEOUS ONCE
Qty: 0 | Refills: 0 | Status: COMPLETED | OUTPATIENT
Start: 2017-09-28 | End: 2017-09-28

## 2017-09-28 RX ORDER — DOCUSATE SODIUM 100 MG
100 CAPSULE ORAL ONCE
Qty: 0 | Refills: 0 | Status: COMPLETED | OUTPATIENT
Start: 2017-09-28 | End: 2017-09-28

## 2017-09-28 RX ADMIN — Medication 100 MILLIGRAM(S): at 23:13

## 2017-09-28 RX ADMIN — SODIUM CHLORIDE 1000 MILLILITER(S): 9 INJECTION INTRAMUSCULAR; INTRAVENOUS; SUBCUTANEOUS at 19:48

## 2017-09-28 RX ADMIN — Medication 500 MILLIGRAM(S): at 23:13

## 2017-09-28 RX ADMIN — Medication 500 MILLIGRAM(S): at 23:12

## 2017-09-28 NOTE — ED STATDOCS - ATTENDING CONTRIBUTION TO CARE
I, Maykel Zuluaga, performed the initial face to face bedside interview with this patient regarding history of present illness, review of symptoms and relevant past medical, social and family history.  I completed an independent physical examination.  I was the initial provider who evaluated this patient. I have signed out the follow up of any pending tests (i.e. labs, radiological studies) to the ACP.  I have communicated the patient’s plan of care and disposition with the ACP.  The history, relevant review of systems, past medical and surgical history, medical decision making, and physical examination was documented by the scribe in my presence and I attest to the accuracy of the documentation.

## 2017-09-28 NOTE — ED STATDOCS - MEDICAL DECISION MAKING DETAILS
58 y/o F c/o worsening abd pain, constipation with plans to receive CT abd imaging for further eval and tx.

## 2017-09-28 NOTE — ED ADULT TRIAGE NOTE - CHIEF COMPLAINT QUOTE
On tx for sinus infection, on Levaquin, a few days ago started dizziness, now nauseated, just came from PCP for RLQ tenderness.  PCP = Sarita

## 2017-09-28 NOTE — ED ADULT NURSE NOTE - CHPI ED SYMPTOMS NEG
no nausea/no hematuria/no diarrhea/no burning urination/no chills/no abdominal distension/no dysuria/no fever/no blood in stool/no vomiting

## 2017-09-28 NOTE — ED STATDOCS - OBJECTIVE STATEMENT
56 y/o F with a PMhx of Graves disease presents to the ED c/o worsening abd pain over the past few days. Pt states that she has been taking Levaquin for sinus infection, stopped yesterday. Pt states that she went to her PMD Nuria who advised that she come to the ED as she has not had a nml BM in x5 days. Pt currently calm, able to provide adequate hx and denies fever, cough, chills, NV, CP or any other acute c/o at this time.

## 2019-05-23 NOTE — PRE-OP CHECKLIST - SKIN PREP
Post-Care Instructions: I reviewed with the patient in detail post-care instructions. Patient is to keep the biopsy site dry overnight, and then apply bacitracin twice daily until healed. Patient may apply hydrogen peroxide soaks to remove any crusting. n/a

## 2019-11-04 NOTE — ED STATDOCS - NS ED MD SCRIBE ATTENDING SCRIBE SECTIONS
Received voicemail from patient regarding scheduling EGD and Esophageal manometry procedures ordered. Returned patient's phone call and spoke with patient. Per patient, he is currently taking Eliquis. Explained to patient that approval to hold Eliquis will need to be requested and received prior to scheduling procedure. Patient instructed to call back with name and contact umber of his Physician who prescribes him the Eliquis. Patient verbalized understanding. Provided patient with my direct contact number.   
HISTORY OF PRESENT ILLNESS/RESULTS/PAST MEDICAL/SURGICAL/SOCIAL HISTORY/REVIEW OF SYSTEMS/PHYSICAL EXAM/DISPOSITION/PROGRESS NOTE

## 2020-03-06 ENCOUNTER — EMERGENCY (EMERGENCY)
Facility: HOSPITAL | Age: 60
LOS: 0 days | Discharge: HOME | End: 2020-03-07
Attending: FAMILY MEDICINE
Payer: COMMERCIAL

## 2020-03-06 VITALS — WEIGHT: 156.97 LBS | HEIGHT: 63 IN

## 2020-03-06 DIAGNOSIS — R53.83 OTHER FATIGUE: ICD-10-CM

## 2020-03-06 DIAGNOSIS — E03.9 HYPOTHYROIDISM, UNSPECIFIED: ICD-10-CM

## 2020-03-06 DIAGNOSIS — R10.9 UNSPECIFIED ABDOMINAL PAIN: ICD-10-CM

## 2020-03-06 DIAGNOSIS — Z90.710 ACQUIRED ABSENCE OF BOTH CERVIX AND UTERUS: ICD-10-CM

## 2020-03-06 DIAGNOSIS — F17.210 NICOTINE DEPENDENCE, CIGARETTES, UNCOMPLICATED: ICD-10-CM

## 2020-03-06 DIAGNOSIS — R11.0 NAUSEA: ICD-10-CM

## 2020-03-06 DIAGNOSIS — K59.00 CONSTIPATION, UNSPECIFIED: ICD-10-CM

## 2020-03-06 DIAGNOSIS — R00.0 TACHYCARDIA, UNSPECIFIED: ICD-10-CM

## 2020-03-06 LAB
ALBUMIN SERPL ELPH-MCNC: 3.6 G/DL — SIGNIFICANT CHANGE UP (ref 3.3–5)
ALP SERPL-CCNC: 84 U/L — SIGNIFICANT CHANGE UP (ref 40–120)
ALT FLD-CCNC: 17 U/L — SIGNIFICANT CHANGE UP (ref 12–78)
ANION GAP SERPL CALC-SCNC: 5 MMOL/L — SIGNIFICANT CHANGE UP (ref 5–17)
APPEARANCE UR: CLEAR — SIGNIFICANT CHANGE UP
APTT BLD: 33.8 SEC — SIGNIFICANT CHANGE UP (ref 27.5–36.3)
AST SERPL-CCNC: 18 U/L — SIGNIFICANT CHANGE UP (ref 15–37)
BASOPHILS # BLD AUTO: 0.04 K/UL — SIGNIFICANT CHANGE UP (ref 0–0.2)
BASOPHILS NFR BLD AUTO: 0.4 % — SIGNIFICANT CHANGE UP (ref 0–2)
BILIRUB SERPL-MCNC: 0.4 MG/DL — SIGNIFICANT CHANGE UP (ref 0.2–1.2)
BILIRUB UR-MCNC: NEGATIVE — SIGNIFICANT CHANGE UP
BUN SERPL-MCNC: 16 MG/DL — SIGNIFICANT CHANGE UP (ref 7–23)
CALCIUM SERPL-MCNC: 9.6 MG/DL — SIGNIFICANT CHANGE UP (ref 8.5–10.1)
CHLORIDE SERPL-SCNC: 104 MMOL/L — SIGNIFICANT CHANGE UP (ref 96–108)
CO2 SERPL-SCNC: 29 MMOL/L — SIGNIFICANT CHANGE UP (ref 22–31)
COLOR SPEC: YELLOW — SIGNIFICANT CHANGE UP
CREAT SERPL-MCNC: 0.76 MG/DL — SIGNIFICANT CHANGE UP (ref 0.5–1.3)
DIFF PNL FLD: ABNORMAL
EOSINOPHIL # BLD AUTO: 0.02 K/UL — SIGNIFICANT CHANGE UP (ref 0–0.5)
EOSINOPHIL NFR BLD AUTO: 0.2 % — SIGNIFICANT CHANGE UP (ref 0–6)
GLUCOSE SERPL-MCNC: 124 MG/DL — HIGH (ref 70–99)
GLUCOSE UR QL: NEGATIVE MG/DL — SIGNIFICANT CHANGE UP
HCT VFR BLD CALC: 48.6 % — HIGH (ref 34.5–45)
HGB BLD-MCNC: 15.6 G/DL — HIGH (ref 11.5–15.5)
IMM GRANULOCYTES NFR BLD AUTO: 0.4 % — SIGNIFICANT CHANGE UP (ref 0–1.5)
INR BLD: 1.1 RATIO — SIGNIFICANT CHANGE UP (ref 0.88–1.16)
KETONES UR-MCNC: ABNORMAL
LACTATE SERPL-SCNC: 1.1 MMOL/L — SIGNIFICANT CHANGE UP (ref 0.7–2)
LEUKOCYTE ESTERASE UR-ACNC: NEGATIVE — SIGNIFICANT CHANGE UP
LIDOCAIN IGE QN: 48 U/L — LOW (ref 73–393)
LYMPHOCYTES # BLD AUTO: 1.89 K/UL — SIGNIFICANT CHANGE UP (ref 1–3.3)
LYMPHOCYTES # BLD AUTO: 18.2 % — SIGNIFICANT CHANGE UP (ref 13–44)
MCHC RBC-ENTMCNC: 30.4 PG — SIGNIFICANT CHANGE UP (ref 27–34)
MCHC RBC-ENTMCNC: 32.1 GM/DL — SIGNIFICANT CHANGE UP (ref 32–36)
MCV RBC AUTO: 94.6 FL — SIGNIFICANT CHANGE UP (ref 80–100)
MONOCYTES # BLD AUTO: 0.57 K/UL — SIGNIFICANT CHANGE UP (ref 0–0.9)
MONOCYTES NFR BLD AUTO: 5.5 % — SIGNIFICANT CHANGE UP (ref 2–14)
NEUTROPHILS # BLD AUTO: 7.8 K/UL — HIGH (ref 1.8–7.4)
NEUTROPHILS NFR BLD AUTO: 75.3 % — SIGNIFICANT CHANGE UP (ref 43–77)
NITRITE UR-MCNC: NEGATIVE — SIGNIFICANT CHANGE UP
PH UR: 5 — SIGNIFICANT CHANGE UP (ref 5–8)
PLATELET # BLD AUTO: 241 K/UL — SIGNIFICANT CHANGE UP (ref 150–400)
POTASSIUM SERPL-MCNC: 3.9 MMOL/L — SIGNIFICANT CHANGE UP (ref 3.5–5.3)
POTASSIUM SERPL-SCNC: 3.9 MMOL/L — SIGNIFICANT CHANGE UP (ref 3.5–5.3)
PROT SERPL-MCNC: 8.3 GM/DL — SIGNIFICANT CHANGE UP (ref 6–8.3)
PROT UR-MCNC: 30 MG/DL
PROTHROM AB SERPL-ACNC: 12.3 SEC — SIGNIFICANT CHANGE UP (ref 10–12.9)
RBC # BLD: 5.14 M/UL — SIGNIFICANT CHANGE UP (ref 3.8–5.2)
RBC # FLD: 12.4 % — SIGNIFICANT CHANGE UP (ref 10.3–14.5)
SODIUM SERPL-SCNC: 138 MMOL/L — SIGNIFICANT CHANGE UP (ref 135–145)
SP GR SPEC: 1.03 — HIGH (ref 1.01–1.02)
UROBILINOGEN FLD QL: 4 MG/DL
WBC # BLD: 10.36 K/UL — SIGNIFICANT CHANGE UP (ref 3.8–10.5)
WBC # FLD AUTO: 10.36 K/UL — SIGNIFICANT CHANGE UP (ref 3.8–10.5)

## 2020-03-06 PROCEDURE — 74177 CT ABD & PELVIS W/CONTRAST: CPT | Mod: 26

## 2020-03-06 PROCEDURE — 71045 X-RAY EXAM CHEST 1 VIEW: CPT | Mod: 26

## 2020-03-06 PROCEDURE — 86901 BLOOD TYPING SEROLOGIC RH(D): CPT

## 2020-03-06 PROCEDURE — 85610 PROTHROMBIN TIME: CPT

## 2020-03-06 PROCEDURE — 93010 ELECTROCARDIOGRAM REPORT: CPT

## 2020-03-06 PROCEDURE — 99284 EMERGENCY DEPT VISIT MOD MDM: CPT | Mod: 25

## 2020-03-06 PROCEDURE — 83690 ASSAY OF LIPASE: CPT

## 2020-03-06 PROCEDURE — 86850 RBC ANTIBODY SCREEN: CPT

## 2020-03-06 PROCEDURE — 36415 COLL VENOUS BLD VENIPUNCTURE: CPT

## 2020-03-06 PROCEDURE — 80053 COMPREHEN METABOLIC PANEL: CPT

## 2020-03-06 PROCEDURE — 81001 URINALYSIS AUTO W/SCOPE: CPT

## 2020-03-06 PROCEDURE — 99284 EMERGENCY DEPT VISIT MOD MDM: CPT

## 2020-03-06 PROCEDURE — 71045 X-RAY EXAM CHEST 1 VIEW: CPT

## 2020-03-06 PROCEDURE — 86900 BLOOD TYPING SEROLOGIC ABO: CPT

## 2020-03-06 PROCEDURE — 93005 ELECTROCARDIOGRAM TRACING: CPT

## 2020-03-06 PROCEDURE — 96374 THER/PROPH/DIAG INJ IV PUSH: CPT | Mod: XU

## 2020-03-06 PROCEDURE — 85025 COMPLETE CBC W/AUTO DIFF WBC: CPT

## 2020-03-06 PROCEDURE — 85730 THROMBOPLASTIN TIME PARTIAL: CPT

## 2020-03-06 PROCEDURE — 96375 TX/PRO/DX INJ NEW DRUG ADDON: CPT

## 2020-03-06 PROCEDURE — 83605 ASSAY OF LACTIC ACID: CPT

## 2020-03-06 PROCEDURE — 87086 URINE CULTURE/COLONY COUNT: CPT

## 2020-03-06 PROCEDURE — 74177 CT ABD & PELVIS W/CONTRAST: CPT

## 2020-03-06 RX ORDER — HYDROMORPHONE HYDROCHLORIDE 2 MG/ML
1 INJECTION INTRAMUSCULAR; INTRAVENOUS; SUBCUTANEOUS ONCE
Refills: 0 | Status: DISCONTINUED | OUTPATIENT
Start: 2020-03-06 | End: 2020-03-06

## 2020-03-06 RX ORDER — SODIUM CHLORIDE 9 MG/ML
1000 INJECTION INTRAMUSCULAR; INTRAVENOUS; SUBCUTANEOUS ONCE
Refills: 0 | Status: COMPLETED | OUTPATIENT
Start: 2020-03-06 | End: 2020-03-06

## 2020-03-06 RX ORDER — KETOROLAC TROMETHAMINE 30 MG/ML
15 SYRINGE (ML) INJECTION ONCE
Refills: 0 | Status: DISCONTINUED | OUTPATIENT
Start: 2020-03-06 | End: 2020-03-06

## 2020-03-06 RX ORDER — ONDANSETRON 8 MG/1
4 TABLET, FILM COATED ORAL ONCE
Refills: 0 | Status: COMPLETED | OUTPATIENT
Start: 2020-03-06 | End: 2020-03-06

## 2020-03-06 RX ADMIN — SODIUM CHLORIDE 1000 MILLILITER(S): 9 INJECTION INTRAMUSCULAR; INTRAVENOUS; SUBCUTANEOUS at 22:25

## 2020-03-06 RX ADMIN — ONDANSETRON 4 MILLIGRAM(S): 8 TABLET, FILM COATED ORAL at 22:26

## 2020-03-06 RX ADMIN — HYDROMORPHONE HYDROCHLORIDE 1 MILLIGRAM(S): 2 INJECTION INTRAMUSCULAR; INTRAVENOUS; SUBCUTANEOUS at 22:26

## 2020-03-06 RX ADMIN — Medication 15 MILLIGRAM(S): at 23:58

## 2020-03-06 NOTE — ED STATDOCS - PATIENT PORTAL LINK FT
You can access the FollowMyHealth Patient Portal offered by [facility] by registering at the following website: [email or web address]. By joining [employer]’s FollowMyHealth portal, you will also be able to view your health information using other applications (apps) compatible with our system.

## 2020-03-06 NOTE — ED STATDOCS - OBJECTIVE STATEMENT
58 y/o female with a PMHx of hypothyroidism, s/p hysterectomy x3 years ago presents to the ED c/o abdominal pain for x3 weeks. Pt had an onset of nausea and fatigue x3 weeks ago, went to urgent care and was given nausea medications. Pt went to urgent care again x2 weeks ago for persistent abdominal pain and was referred to GI. Pt had an endo done a few days ago with Dr. Mckeon, was told she had gastritis. No BM x1 week. Normally has a BM everyday. Pt was told to take Miralax and Metamucil. +Smoker. NKDA. PMD: [place].

## 2020-03-06 NOTE — ED ADULT TRIAGE NOTE - CHIEF COMPLAINT QUOTE
Patient presents with stomach pain, patient reports for the past 7 days she has not had a full bowel movement. Patient states she was able to pass a small amount after Miralax. Metemucil and ducolax. Patient went to GI [facility] and was told she has gastroenteritis. Denies nausea vomiting.

## 2020-03-06 NOTE — ED STATDOCS - CHPI ED SYMPTOM POS
PATIENT NAME: JERRY MILLER

: 1957

GENDER: FEMALE

MRN: [id number]

VISIT DATE: 2017

DISCHARGE DATE: 17 1443

VISIT LOCKED DATE TIME: 

PHYSICIAN: PEDRO HERNÁNDEZ  

RESOURCE: PEDRO HERNÁNDEZ  

 

           

           

REASON FOR APPOINTMENT

           

          1. LFBD #2 MTG

           

HISTORY OF PRESENT ILLNESS

           

      HISTORY OF PRESENT ILLNESS:

      PAIN

           

           

          THE PATIENT DESCRIBES THE PAIN...

           

      FALL RISK SCREENING:

      SCREENING

           

           

          :NO FALLS IN THE PAST YEAR

           

CURRENT MEDICATIONS

           

          TAKING GABAPENTIN 300 MG CAPSULE 1 CAPSULE ORALLY SPECIAL FUNDS

          TWO TIMES A DAY FOR PAINMDD2, NOTES: 3 DAYS AGO

          TAKING CYMBALTA 30 MG CAPSULE DELAYED RELEASE PARTICLES 1 CAPSULE

          ORALLY TWICE A DAY, NOTES:  9AM

          TAKING SKELAXIN 800 MG TABLET 1 TABLET ORALLY BID PRN, NOTES: 2

          WEEKS AGO

          TAKING ALLOPURINOL 300 MG TABLET 1 TABLET ORALLY ONCE A DAY,

          NOTES:  9AM

          TAKING SINGULAIR 10 MG TABLET 1 TABLET IN THE EVENING ORALLY ONCE

          A DAY, NOTES:  9PM

          TAKING BENTYL 20 MG TABLET 1 TABLET ORALLY EVERY 4 HOURS AS

          NEEDED, NOTES:  9AM

          TAKING VENTOLIN  (90 BASE) MCG/ACT AEROSOL SOLUTION 2

          PUFFS INHALATION AS NEEDED, NOTES: 2 MONTHS AGO

          TAKING VITAMIN B COMPLEX CAPSULE AS DIRECTED ORALLY DAILY, NOTES:

           9AM

          TAKING HYDROCHLOROTHIAZIDE 25 25 MG TABLET AS DIRECTED ORAL

          DAILY, NOTES:  9AM

          TAKING LATANOPROST 0.005 % SOLUTION 1 DROP INTO BOTH EYES EVENING

          OPHTHALMIC ONCE A DAY, NOTES:  11PM

          TAKING SYMBICORT 80-4.5 MCG/ACT AEROSOL 2 PUFFS INHALATION TWICE

          A DAY, NOTES:  8AM

          TAKING PREMARIN 0.625 MG/GM CREAM VAGINAL THREE TIMES A WEEK,

          NOTES: MONDAY

          TAKING TYLENOL 500 MG TABLET 1 TABLET AS NEEDED ORALLY EVERY 6

          HRS, NOTES: NONE RECENTLY

          TAKING BENTYL 10 MG CAPSULE 1 CAPSULE ORALLY TWO TIMES A DAY,

          NOTES: NONE RECENTLY

          TAKING REFRESH CELLUVISC 1 % SOLUTION 1 DROP INTO AFFECTED EYE AS

          NEEDED OPHTHALMIC 24 TIME(S) A DAY, NOTES:  10AM

          TAKING DIFLUCAN 200 MG TABLET 1 TABLET ORALLY AS DIRECTED, NOTES:

          MONTHS

          TAKING FIBER 58.6 % POWDER ORALLY AS NEEDED, NOTES:  9AM

          TAKING CRANBERRY 405 MG CAPSULE ORALLY DAILY, NOTES:  9AM

          TAKING ZYRTEC 10 MG TABLET CHEWABLE 1 TABLET ORALLY ONCE A DAY,

          NOTES:  9AM

          TAKING TRAMADOL HCL 50 MG TABLET 1 TABLET AS NEEDED ORALLY Q8H

          PRN MDD3, NOTES: 1 WEEK AGO

          TAKING FLONASE ALLERGY RELIEF 50 MCG/ACT SUSPENSION 1 SPRAY IN

          EACH NOSTRIL NASALLY ONCE A DAY, NOTES:  9AM

          TAKING OXYBUTYNIN CHLORIDE 5 MG TABLET 2 TABLETS ORALLY 2 TIMES A

          DAY, NOTES:  9AM

          TAKING ALPHA LIPOIC ACID 100 MG CAPSULE 1 CAP ORALLY DAILY,

          NOTES:  9AM

          TAKING XIIDRA 5 % SOLUTION 1 DROP INTO AFFECTED EYE OPHTHALMIC

          TWICE A DAY, NOTES:  10AM

          NOT-TAKING CABERGOLINE 0.5 MG TABLET 0.5 TABLET ORALLY TWICE A

          WEEK (TAKES 0.05 MG 1/2 TABLET), NOTES: MEDICATION DISCONTINUED

          NOT-TAKING MONOVISC 88 MG/4ML SOLUTION PREFILLED SYRINGE

          INTRA-ARTICULAR EVERY 6 MONTHS, NOTES: EVERY 6 MO (LAST 16)

          NOT-TAKING RHINOCORT AQUA 32 MCG/ACT SUSPENSION 1 SPRAY IN EACH

          NOSTRIL NASALLY AS NEEDED

          NOT-TAKING OXYBUTYNIN CHLORIDE ER 10 MG TABLET EXTENDED RELEASE

          24 HOUR 1 TABLET ORALLY BID

          NOT-TAKING ULTRAM 50 MG TABLET 1 TABLET AS NEEDED FOR PAIN ORALLY

          EVERY 6 HRS MDD1

          NOT-TAKING NASACORT ALLERGY 24HR 55 MCG/ACT AEROSOL 1 PUFF IN

          EACH NOSTRIL NASALLY ONCE A DAY

          NOT-TAKING CELEBREX 100 MG CAPSULE 1 CAPSULE ORALLY TWICE A DAY

          NOT-TAKING VITAMIN C 500 MG TABLET AS DIRECTED ORALLY DAILY,

          NOTES: 16 0900

          MEDICATION LIST REVIEWED AND RECONCILED WITH THE PATIENT

           

PAST MEDICAL HISTORY

           

          DIABETES

          BLOOD PRESSURE

          ASTHMA

          PITUITARY ADENOMA

          ARTHRITIS

          RUPTURED DISC/BACK PAIN

          IBS WITH DIARRHEA

          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

           

ALLERGIES

           

          PENICILLIN (FOR ALLERGIES USE ONLY): RASH: ALLERGY

          SULFA (FOR ALLERGY USE ONLY): RASH: ALLERGY

          PHENERGAN: BLOOD CLOT: SIDE EFFECTS

          LATEX CONDOMS, RUBBER BANDS, GLOVES: RASH: ALLERGY

          ERYTHROMYCIN: RASH: ALLERGY

          NICLKEL: RASH: ALLERGY

          SULFITES: DYSPNEA: ALLERGY

          WHEAT: GI UPSET, WATERY EYES AND SINUS CONGERSTION

           

SOCIAL HISTORY

           

          GENERAL:

           

          TOBACCO USE

          ARE YOU A:NONSMOKER

           

           

          LEARNING BARRIERS / SPECIAL NEEDS ORIENTED TO PLAN OF CARE:

          PATIENT, PAIN MANAGEMENT PATIENT, ORIENTED TO PLAN OF CARE:

          PATIENT, PAIN MANAGEMENT PATIENT.

           

           

          NEW PATIENT PAIN DIARY

          TODAY'S VISITNOTES

          FROM 0-10, WHAT LEVEL IS YOUR PAIN TODAY?0

           

           

          PAIN CLINIC PFS, CLERGY, PUBLIC HEALTH REFERRALS

          PFS REFERRAL NEEDED?NO

          CLERGY REFERRAL NEEDED?NO

          PUBLIC HEALTH REFERRAL NEEDED?NO

          WAS THE PROVIDER NOTIFIED OF ANY PERTINENT INFO?NO

          PFS REFERRAL NEEDED?NO

          CLERGY REFERRAL NEEDED?NO

          PUBLIC HEALTH REFERRAL NEEDED?NO

          WAS THE PROVIDER NOTIFIED OF ANY PERTINENT INFO?NO

           

REVIEW OF SYSTEMS

           

      CONSTITUTIONAL:

           

          ANY CHANGE IN YOUR MEDICAL CONDITION? NO . CHILLS NO . FEVER NO .

           

      INFECTION:

           

          DO YOU HAVE NEW INFECTIONS? NO . DO YOU HAVE HISTORY OF MRSA? NO

          .

           

      MUSCULOSKELETAL:

           

          ANY NEW PATTERNS OF PAIN OR NUMBNESS? NO .

           

      GASTROENTEROLOGY:

           

          ANY NEW CHANGE IN BOWEL CONTROL? NO .

           

      GENITOURINARY:

           

          ANY NEW CHANGE IN BLADDER CONTROL? NO . IS THERE A CHANCE YOU

          COULD BE PREGNANT? NO .

           

      HEMATOLOGY/LYMPH:

           

          DO YOU TAKE ANY BLOOD THINNERS? (FOR EXAMPLE- COUMADIN, PLAVIX,

          AGGRENOX, PLATEL, PRADAXA, OR XARELTO) NO . WHEN WAS YOUR LAST

          DOSE? DATE: TIME: .

           

      NEUROLOGY:

           

          HAVE YOU FALLEN IN THE PAST 6 MONTHS? NO . ANY NEW EXTREMITY

          NUMBNESS OR WEAKNESS? NO .

           

      CARDIOLOGY:

           

          DO YOU HAVE A PACEMAKER OR DEFIBRILLATOR? NO .

           

      RESPIRATORY:

           

          HAVE YOU BEEN SICK IN THE PAST WEEK? NO . FEVER NO . FLU LIKE

          SYMPTOMS? NO . COUGH NO .

           

      INTEGUMENTARY:

           

          DO YOU HAVE ANY RASHES OR OPEN SORES? NO .

           

      ALLERGIC/IMMUNO:

           

          ARE YOU ALLERGIC TO SHELLFISH OR IV DYE? NO . ANY NEW ALLERGIES?

          NO .

           

      PSYCHIATRIC:

           

          DO YOU HAVE THOUGHTS OF HURTING YOURSELF OR SOMEONE ELSE? NO .

          ARE YOU ABUSED, NEGLECTED, OR IN AN UNSAFE ENVIRONMENT? NO .

           

      ENDOCRINOLOGY:

           

          ARE YOU DIABETIC? NO .

           

      OTHER:

           

          DO YOU NEED ANY PRESCRIPTIONS? NO . IF YES, PLEASE LIST: ____ .

          ANY NEW PROBLEMS WITH YOUR MEDICATIONS? NO . WHEN DID YOU LAST

          EAT?  7:15AM . WHEN DID YOU LAST DRINK?  11:15AM . WHAT

          DID YOU LAST DRINK? [employer] . NAME OF PERSON DRIVING YOU HOME?

          SENA . DO YOU HAVE ANY OTHER QUESTIONS OR CONCERNS NO .

           

      REVIEWED BY:

           

          PROVIDER: _____ .

           

VITAL SIGNS

           

           LBS, HT 65", BMI 38.60 INDEX, /81 MM HG, HR 64 /MIN,

          RR 16 /MIN, TEMP 96.0 F, OXYGEN SAT % 98, SAFE IN ENV? (Y/N) Y,

          NA INITIALS TL 1328, REVIEWED BY: HILLARY.

           

ASSESSMENTS

           

          SPONDYLOSIS WITHOUT MYELOPATHY OR RADICULOPATHY, LUMBAR REGION -

          M47.816 (PRIMARY)

           

          SPONDYLOSIS WITHOUT MYELOPATHY OR RADICULOPATHY, LUMBOSACRAL

          REGION - M47.817

           

PROCEDURES

           

      PN WORKMANS' COMP OPINION

          IN YOUR OPINION, WAS THE INCIDENT THAT THE PATIENT DESCRIBED THE

          COMPETENT MEDICAL CAUSE OF THIS INJURY/ILLNESS? YES

          ARE THE PATIENT'S COMPLAINTS CONSISTENT WITH HIS/HER HISTORY OF

          THE INJURY/ILLNESS? YES

          IS THE PATIENT'S HISTORY OF THE INJURY/ILLNESS CONSISTENT WITH

          YOUR OBJECTIVE FINDING? YES

          WHAT IS THE PERCENTAGE OF TEMPORARY IMPAIRMENT? MODERATE TO

          MARKED = 66.7%

          IS THE PATIENT WORKING? NO

          DOCTOR ON SITE: PEDRO MILLER MD

      PN LUMBAR FACET BLOCK DIAGNOSTIC

          PRE PROCEDURE DIAGNOSIS LUMBAR SPONDYLOSIS, LUMBOSACRAL

          SPONDYLOSIS

          POST PROCEDURE DIAGNOSIS LUMBAR SPONDYLOSIS, LUMBOSACRAL

          SPONDYLOSIS

          PROCEDURE LEFT L4-L5 AND LEFT L5-S1 FACET BLOCK DIAGNOSTIC NUMBER

          2

          SURGEON DR. PEDRO HERNÁNDEZ

          ASSISTANT NONE

          ANESTHESIA LOCAL

          PRE PROCEDURE NOTE THE PATIENT WITH HISTORY OF CHRONIC LOW BACK

          PAIN. I EVALUATED THE PATIENT AND REVIEWED THE CHART. I WENT OVER

          THE RISKS, ALTERNATIVES, AND BENEFITS ASSOCIATED WITH THIS

          PROCEDURE. THE PATIENT WOULD LIKE TO PROCEED AND GAVE CONSENT TO

          PERFORM THE PROCEDURE. AS AGREED WITH THE PATIENT WE ARE DOING

          THIS PROCEDURE TO DETERMINE IF THE PATIENT IS A CANDIDATE FOR A

          RADIOFREQUENCY ABLATION OF THE FACETS JOINTS. THE PATIENT DENIES

          UNEXPLAINABLE WEIGHT LOSS, FEVER, CHILLS, OR NEW CHANGES IN

          URINARY OR BOWEL CONTROL

          DESCRIPTION OF PROCEDURE THE PATIENT WAS BROUGHT TO THE PROCEDURE

          ROOM AND PLACED IN THE PRONE POSITION. THE LUMBOSACRAL AREA WAS

          CLEANED WITH CHLORAPREP SOLUTION AND DRAPED ASEPTICALLY. THE

          PROCEDURE WAS DONE UNDER STERILE CONDITIONS. I CHECKED LATERALITY

          AND THE LEVEL WHERE THE PROCEDURE WAS GOING TO BE PERFORMED WITH

          THE PATIENT AND THE SUPPORTING STAFF AT THE MOMENT OF THE TIME

          OUT IN THE PROCEDURE ROOM. UNDER FLUOROSCOPIC GUIDANCE, TARGETS

          WERE SELECTED AT THE INTERSECTION OF THE LEFT TRANSVERSE PROCESS

          OF L4, L5 AND ALA OF S1 WITH ITS RESPECTIVE SUPERIOR ARTICULAR

          PROCESS. LIDOCAINE WAS USED TO NUMB THE SKIN AND THE SUBCUTANEOUS

          TISSUE BELOW IT. SPINAL NEEDLE, 22-GAUGE WAS ADVANCED UNDER

          FLUOROSCOPIC GUIDANCE AND FOLLOWING PATIENT FEEDBACK UNTIL THE

          TARGETS WERE REACHED. POSITION OF THE NEEDLES WAS VERIFIED WITH

          AP AND LATERAL VIEWS. AFTER PROPER POSITION OF THE NEEDLES WAS

          ACHIEVED, ISOVUE-M DYE 30% 0.1 ML WAS INJECTED AT EACH SITE

          SHOWING ADEQUATE SPREAD OF THE DYE. THEN A SOLUTION OF 0.4 ML OF

          BUPIVACAINE 0.25% WAS INJECTED AT EACH SITE. THERE WAS NO

          EVIDENCE OF BLOOD, PARESTHESIA OR CEREBROSPINAL FLUID DURING THE

          PROCEDURE. THE PATIENT WAS SENT TO THE RECOVERY ROOM. THE PATIENT

          WAS MOVING THE EXTREMITIES AND DOING WELL. THERE WAS NO

          COMPLICATION DURING THE PROCEDURE. FLUOROSCOPY TIME WAS 27

          SECONDS

          POST PROCEDURE NOTE THE PATIENT WILL DOCUMENT HIS PAIN LEVEL AND

          RESPONSE TO THIS PROCEDURE EVERY 30 MINUTES. THE PATIENT WILL BE

          SEEN IN A FOLLOW UP IN THE NEXT FEW WEEKS. FURTHER DETERMINATION

          FOR HIS CASE WILL BE DONE AT THE NEXT VISIT. INSTRUCTIONS WERE

          GIVEN, QUESTIONS WERE ANSWERED, AND THE PATIENT EXPRESSED

          UNDERSTANDING AND AGREED WITH THE PLAN. INSTRUCTIONS WERE GIVEN,

          QUESTIONS WERE ANSWERED, PATIENT REPORTS UNDERSTANDING AND AGREES

          WITH THE PLAN. I, SANDY UREÑA, DOCUMENTED THE ABOVE INFORMATION

          ACTING AS A SCRIBE FOR DR. HERNÁNDEZ. I HAVE REVIEWED THE ABOVE

          DOCUMENT, WRITTEN BY SANDY UREÑA SCRIBCARMEN AND I VERIFY THAT IT IS

          ACCURATE.

           

DIAGNOSTIC IMAGING

           

          [facility] FACET BLOCK (PAIN)[phone]

           

PROCEDURE CODES

           

          45143 INJ PARAVERT F JNT L/S 1 LEV

           

          83414 INJ PARAVERT F JNT L/S 2 LEV

           

          6045F RADXPS IN END [id number] PXD

           

FOLLOW UP

           

          3 WEEKS

           

 

ELECTRONICALLY SIGNED BY PEDRO HERNÁNDEZ MD ON

          2017 AT 07:47 PM EST

           

           

           

 

DISCLAIMER :

THIS IS A VISIT SUMMARY EXTRACTED FROM THE [employer] CHART.

IT IS NOT A COPY OF THE [employer] PROGRESS NOTE.

MTDD
CRAMPS/PAIN/CONSTIPATION

## 2020-03-06 NOTE — ED STATDOCS - PROGRESS NOTE DETAILS
Late note:ct pos for constipation and no other pathology. Received mag citrate and enema without relief. Will order go lytely. Pt prefers to take at home. Harvey RUIZ

## 2020-03-07 ENCOUNTER — TRANSCRIPTION ENCOUNTER (OUTPATIENT)
Age: 60
End: 2020-03-07

## 2020-03-07 VITALS
DIASTOLIC BLOOD PRESSURE: 88 MMHG | OXYGEN SATURATION: 99 % | TEMPERATURE: 98 F | SYSTOLIC BLOOD PRESSURE: 151 MMHG | HEART RATE: 90 BPM | RESPIRATION RATE: 18 BRPM

## 2020-03-07 RX ORDER — SOD SULF/SODIUM/NAHCO3/KCL/PEG
4000 SOLUTION, RECONSTITUTED, ORAL ORAL ONCE
Refills: 0 | Status: DISCONTINUED | OUTPATIENT
Start: 2020-03-07 | End: 2020-03-07

## 2020-03-07 RX ORDER — MULTIVIT WITH MIN/MFOLATE/K2 340-15/3 G
1 POWDER (GRAM) ORAL ONCE
Refills: 0 | Status: COMPLETED | OUTPATIENT
Start: 2020-03-07 | End: 2020-03-07

## 2020-03-07 RX ADMIN — Medication 1 BOTTLE: at 00:34

## 2020-03-07 NOTE — ED ADULT NURSE NOTE - OBJECTIVE STATEMENT
Pt presents to ED for abdominal pain. Pt states no BM X 8 days and has been taking OTC laxatives with no results. Pt recently placed on high fiber diet by DI for gastroenteritis. Denies N/V

## 2020-03-08 LAB
CULTURE RESULTS: SIGNIFICANT CHANGE UP
SPECIMEN SOURCE: SIGNIFICANT CHANGE UP

## 2020-10-16 NOTE — PROGRESS NOTE ADULT - I WAS PHYSICALLY PRESENT FOR THE KEY PORTIONS OF THE EVALUATION AND MANAGEMENT (E/M) SERVICE PROVIDED.  I AGREE WITH THE ABOVE HISTORY, PHYSICAL, AND PLAN WHICH I HAVE REVIEWED AND EDITED WHERE APPROPRIATE
CARDIOLOGY CONSULTATION    Patient Name:  Anne Rodriguez    :  1954    Reason for Consultation:   Left chest discomfort    History of Present Illness:   Anne Rodriguez presents to [facility], following history of sudden onset of generalized malaise and cough as well as increasing shortness of breath and left-sided pressure-like sensation in his chest not specifically related to exertion. He has no previous cardiac history. He does however have a history of M avium complex for which he has been treated since 2019. He denies any mopped assist presently. He denies palpitations or sudden lightheadedness. 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 Surgical History:   has a past surgical history that includes Abscess Drainage (); Colonoscopy (2013); lumbar fusion (2019); bronchoscopy (N/A, 2019); and bronchoscopy (N/A, 10/7/2019). Social History:   reports that he quit smoking about 6 years ago. His smoking use included cigarettes. He started smoking about 52 years ago. He has a 92.00 pack-year smoking history. He has never used smokeless tobacco. He reports that he does not drink alcohol or use drugs.      Family History:  family history includes Other in his father and mother. Both parents  secondary to infirmities of old age. Medications:  Prior to Admission medications    Medication Sig Start Date End Date Taking? Authorizing Provider   clarithromycin (BIAXIN) 500 MG tablet Take 2 tablets by mouth three times a week  Patient taking differently: Take 1,000 mg by mouth three times a week mon-wed-20 Yes Paulo Lloyd MD   gabapentin (NEURONTIN) 300 MG capsule Take 300 mg by mouth 3 times daily. 20  Yes Historical Provider, MD   oxyCODONE-acetaminophen (PERCOCET) 7.5-325 MG per tablet TK 1 T PO BID PRN P 3/11/20  Yes Historical Provider, MD   Fluticasone-Umeclidin-Vilant (TRELEGY ELLIPTA) 100-62.5-25 MCG/INH AEPB Inhale 1 puff into the lungs daily  Patient taking differently: Inhale 1 puff into the lungs every morning  19  Yes MIGUE Willoughby - CNP   OXYGEN Inhale 3.5 L into the lungs continuous prn    Yes Historical Provider, MD   etodolac (LODINE) 500 MG tablet Take 500 mg by mouth every morning    Yes Historical Provider, MD   ethambutol (MYAMBUTOL) 400 MG tablet Take 2 tablets by mouth three times a week  Patient taking differently: Take 800 mg by mouth three times a week -20  Paulo Lloyd MD   rifAMPin (RIFADIN) 300 MG capsule Take 2 capsules by mouth three times a week  Patient taking differently: Take 600 mg by mouth three times a week mon-wed-20  Paulo Lloyd MD   traMADol (ULTRAM) 50 MG tablet Take 50 mg by mouth every 6 hours as needed for Pain. Historical Provider, MD   clotrimazole-betamethasone (LOTRISONE) 1-0.05 % lotion Apply topically 2 times daily. 10/7/19   Jim Jaimes,    albuterol sulfate  (90 Base) MCG/ACT inhaler Inhale 2 puffs into the lungs every 6 hours as needed for Wheezing or Shortness of Breath    Historical Provider, MD       Allergies:  Patient has no known allergies.      Review of Systems:   · Constitutional: there has been no unanticipated weight loss. There's been no significant change in energy level, sleep pattern or activity level. No fever chills or rigors. · Eyes: No visual changes or diplopia. No scleral icterus. · ENT: No Headaches, hearing loss or vertigo. No mouth sores or sore throat. No change in taste or smell. · Cardiovascular: + Pressure-like chest discomfort, dyspnea on exertion, but denies palpitations, loss of consciousness, no phlebitis, no claudication. · Respiratory: Has cough and wheezing, no sputum production. No hemoptysis, pleuritic pain. · Gastrointestinal: No abdominal pain, appetite loss, blood in stools. No change in bowel habits. No hematemesis  · Genitourinary: No dysuria, trouble voiding or hematuria. No nocturia or increased frequency. · Musculoskeletal:  No gait disturbance, weakness or joint complaints. · Integumentary: No rash or pruritis. · Neurological: No headache, diplopia, change in muscle strength, numbness or tingling. No change in gait, balance, coordination, mood, affect, memory, mentation, behavior. · Psychiatric: No anxiety or depression. · Endocrine: No temperature intolerance. No excessive thirst, fluid intake, or urination. No tremor. · Hematologic/Lymphatic: No abnormal bruising or bleeding, blood clots or swollen lymph nodes. · Allergic/Immunologic: No nasal congestion or hives. Physical Examination:    Vital Signs: /70   Pulse 70   Temp 99.3 °F (37.4 °C) (Oral)   Resp 18   Ht 6' (1.829 m)   Wt 192 lb 8 oz (87.3 kg)   SpO2 90%   BMI 26.11 kg/m²   General appearance: Well preserved, mesomorphic body habitus, alert, no distress. Skin: Skin color, texture, turgor normal. No rashes or lesions. No induration or tightening palpated. Head: Normocephalic. No masses, lesions, tenderness or abnormalities  Eyes: Conjunctivae/corneas clear. PERRL, EOMs intact. Sclera non icteric. Ears: External ears normal. Canals clear. TM's clear bilaterally.  Hearing normal to Statement Selected finger rub. Nose/Sinuses: Nares normal. Septum midline. Mucosa normal. No drainage or sinus tenderness. Oropharynx: Lips, mucosa, and tongue normal. Oropharynx clear with no exudate seen. Neck: Neck supple and symmetric. No adenopathy. Thyroid symmetric, normal size, without nodules. Trachea is midline. Carotids brisk in upstroke without bruits, no abnormal JVP noted at 45°. Chest: Even excursion  Lungs: Lungs clear to auscultation bilaterally. No retractions or use of accessory muscles. No tactile vocal fremitus. No rhonchi, crackles or rales. Heart:  S1 > S2. Regular rhythm. No gallop; grade 2/6 early systolic murmur left sternal border no rub, palpable thrill or heave noted. PMI 5th intercostal space midclavicular line. Abdomen: Abdomen soft, mildly protuberant, non-tender. BS normal. No masses, organomegaly. No hernia noted. Extremities: Extremities normal. No deformities, edema, or skin discoloration. No cyanosis or clubbing noted to the nails. Peripheral pulses present 2+ upper extremities and present 2+  lower extremities. Musculoskeletal: Spine ROM normal. Muscular strength intact. Neuro: Cranial nerves intact. Motor: Strength 5/5 in all extremities. Reflexes 2+ in all extremities. No focal weakness. Sensory: grossly normal to touch. Coordination intact. Pertinent Labs:  CBC:   Recent Labs     10/15/20  1315 10/16/20  0615   WBC 4.1* 5.8   HGB 17.3* 17.1*    144     BMP:  Recent Labs     10/15/20  1315 10/16/20  0615    136   K 3.6 4.0    105   CO2 24 23   BUN 10 11   CREATININE 0.9 0.7   GLUCOSE 96 87   LABGLOM >60 >60     ABGs:   Lab Results   Component Value Date    PH 7.440 05/28/2019    PO2 59.1 05/28/2019    PCO2 32.1 05/28/2019     INR: No results for input(s): INR in the last 72 hours.   PRO-BNP:   Lab Results   Component Value Date    PROBNP 194 (H) 10/15/2020    PROBNP 351 (H) 10/15/2020      Cardiac Injury Profile:   Recent Labs     10/16/20  0040 10/16/20  0359 TROPONINI <0.01 <0.01      Lipid Profile:   Lab Results   Component Value Date    TRIG 48 10/16/2020    HDL 56 10/16/2020    LDLCALC 79 10/16/2020    CHOL 145 10/16/2020      Thyroid:   Lab Results   Component Value Date    TSH 1.290 10/16/2020      Hemoglobin A1C: No components found for: HGBA1C   ECG:  See report    Radiology:  Xr Chest Portable    Result Date: 10/15/2020  EXAMINATION: ONE XRAY VIEW OF THE CHEST 10/15/2020 1:24 pm COMPARISON: October 2, 2019. Correlated with October 6, 2019 CT of the chest HISTORY: ORDERING SYSTEM PROVIDED HISTORY: Shortness of breath TECHNOLOGIST PROVIDED HISTORY: Reason for exam:->Shortness of breath FINDINGS: There are advanced emphysematous changes throughout both lungs. A focal opacity in the left mid upper lung zone is again noted, better appreciated on recent CT of the chest.  No convincing evidence of a pneumothorax. Bibasilar airspace opacities are present, left greater than right. No convincing evidence of a sizable pleural effusion. Heart size is unable to be accurately assessed on this single portable view of the chest, but appears to be stable. Bones are diffusely osteopenic. Severe emphysematous changes with bibasilar linear airspace opacities, potentially basis of scarring or of pulmonary edema. Cta Pulmonary W Contrast    Result Date: 10/15/2020  EXAMINATION: CTA OF THE CHEST 10/15/2020 3:02 pm TECHNIQUE: CTA of the chest was performed after the administration of intravenous contrast.  Multiplanar reformatted images are provided for review. MIP images are provided for review. Dose modulation, iterative reconstruction, and/or weight based adjustment of the mA/kV was utilized to reduce the radiation dose to as low as reasonably achievable.  COMPARISON: CT chest from October 6, 2019 HISTORY: ORDERING SYSTEM PROVIDED HISTORY: r/o PE vs pneumonia TECHNOLOGIST PROVIDED HISTORY: Reason for exam:->r/o PE vs pneumonia FINDINGS: The thyroid gland and remaining soft tissue structures of the neck appear unremarkable. No concerning axillary adenopathy. The anterior and posterior chest wall appear unremarkable. There are multiple prominent mediastinal lymph nodes which are not significantly changed from prior exam reference lymph node in the precarinal region series 302 image 111 measures 14 mm (previously 12 mm. Pretracheal lymph node series 302, image 82 measures 11 mm in short axis (previously 11 mm) Normal appearance of the esophagus. Small hiatal hernia. Normal appearance of the thoracic aorta. No aneurysmal dilatation. Main pulmonary artery is of normal size. No thromboembolic phenomenon identified within the pulmonary arterial system. Heart chambers are not enlarged. No pericardial effusion. Mild left coronary distribution coronary artery disease. Airway appears patent. No endobronchial lesions. There is mild bilateral lower lung airway thickening suggestive of chronic inflammation. Severe cystic lung parenchymal changes and bulla formation which are not significantly changed from prior exam.  The distribution and appearance of bilateral reticular opacities is similar to prior exam and are most consistent with scarring/fibrotic change. No pleural effusion or pneumothorax. Posterior irregular nodularity series 301, image 34 measures approximately 9 mm and is unchanged from prior exam. Lingular nodularity series 301, image 96 measures 20 mm and 16 mm respectively. Both of these appear to have increased in the interim between exams. Focal area of pleural thickening in the medial anterior left upper lung series 301, image 65 has decreased in size. Upper abdomen is unremarkable. Thoracic vertebral body height and alignment is maintained. Multilevel endplate spondylosis. 1.  No evidence of pulmonary embolism. 2.  No airspace disease, pleural effusions, or pneumothoraces. Severe emphysematous changes and pleuroparenchymal scarring. 3.   Interval increase in irregular nodules in the lingula. Largest measures 20 mm. (Follow-up chest CT in 3 months is suggested to reassess this area.) 4. Essentially stable mediastinal lymphadenopathy which can also be reassessed on recommended follow-up chest CT. 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 *      Plan:  Based upon the patient's clinical presentation it would appear that his chest discomfort could be cardiac but certainly could be secondary to his underlying pulmonary disease. Will allow him to stabilize and then obtain nuclear stress test.  Based upon the results further recommendations will be made. Would also recommend that he maintain his LDL cholesterol within updated 2020 ACC/AHA/AACE/ESC/EAS cholesterol guidelines. I have spent more than 45 minutes face to face with Maeve Beard reviewing notes and laboratory data with greater than 50% of this time instructing and counseling the patient regarding my findings and recommendations and I have answered all questions as posed to me by  Aure Goldberg. Thank you, Fani Granados MD for allowing me to consult in the care of this patient. Tania Swanson DO, FACP, FACC, FSCAI    NOTE:  This report was transcribed using voice recognition software. Every effort was made to ensure accuracy; however, inadvertent computerized transcription errors may be present.

## 2020-10-23 PROBLEM — Z00.00 ENCOUNTER FOR PREVENTIVE HEALTH EXAMINATION: Status: ACTIVE | Noted: 2020-10-23

## 2020-11-24 ENCOUNTER — APPOINTMENT (OUTPATIENT)
Dept: DERMATOLOGY | Facility: CLINIC | Age: 60
End: 2020-11-24
Payer: COMMERCIAL

## 2020-11-24 ENCOUNTER — APPOINTMENT (OUTPATIENT)
Dept: DERMATOLOGY | Facility: CLINIC | Age: 60
End: 2020-11-24

## 2020-11-24 DIAGNOSIS — L82.1 OTHER SEBORRHEIC KERATOSIS: ICD-10-CM

## 2020-11-24 DIAGNOSIS — L90.5 SCAR CONDITIONS AND FIBROSIS OF SKIN: ICD-10-CM

## 2020-11-24 PROCEDURE — 99203 OFFICE O/P NEW LOW 30 MIN: CPT

## 2021-07-15 NOTE — ED ADULT NURSE NOTE - GASTROINTESTINAL ASSESSMENT
Could I please switch the pharmacy if I get a prescription?? I found one a little closer.      Dion [place] [address] [place] [address]       Please also advise on post surgical foot care.       WDL

## 2021-07-16 ENCOUNTER — APPOINTMENT (OUTPATIENT)
Dept: MRI IMAGING | Facility: CLINIC | Age: 61
End: 2021-07-16
Payer: COMMERCIAL

## 2021-07-16 ENCOUNTER — OUTPATIENT (OUTPATIENT)
Dept: OUTPATIENT SERVICES | Facility: HOSPITAL | Age: 61
LOS: 1 days | End: 2021-07-16
Payer: COMMERCIAL

## 2021-07-16 DIAGNOSIS — M53.3 SACROCOCCYGEAL DISORDERS, NOT ELSEWHERE CLASSIFIED: ICD-10-CM

## 2021-07-16 PROCEDURE — A9585: CPT

## 2021-07-16 PROCEDURE — 72197 MRI PELVIS W/O & W/DYE: CPT

## 2021-07-16 PROCEDURE — 72197 MRI PELVIS W/O & W/DYE: CPT | Mod: 26

## 2021-08-02 ENCOUNTER — APPOINTMENT (OUTPATIENT)
Dept: CT IMAGING | Facility: CLINIC | Age: 61
End: 2021-08-02
Payer: COMMERCIAL

## 2021-08-02 ENCOUNTER — OUTPATIENT (OUTPATIENT)
Dept: OUTPATIENT SERVICES | Facility: HOSPITAL | Age: 61
LOS: 1 days | End: 2021-08-02
Payer: COMMERCIAL

## 2021-08-02 DIAGNOSIS — Z00.8 ENCOUNTER FOR OTHER GENERAL EXAMINATION: ICD-10-CM

## 2021-08-02 PROCEDURE — 70450 CT HEAD/BRAIN W/O DYE: CPT

## 2021-08-02 PROCEDURE — 70450 CT HEAD/BRAIN W/O DYE: CPT | Mod: 26

## 2021-09-13 ENCOUNTER — TRANSCRIPTION ENCOUNTER (OUTPATIENT)
Age: 61
End: 2021-09-13

## 2022-05-06 ENCOUNTER — NON-APPOINTMENT (OUTPATIENT)
Age: 62
End: 2022-05-06

## 2022-07-21 ENCOUNTER — OUTPATIENT (OUTPATIENT)
Dept: OUTPATIENT SERVICES | Facility: HOSPITAL | Age: 62
LOS: 1 days | End: 2022-07-21
Payer: COMMERCIAL

## 2022-07-21 ENCOUNTER — APPOINTMENT (OUTPATIENT)
Dept: ULTRASOUND IMAGING | Facility: CLINIC | Age: 62
End: 2022-07-21

## 2022-07-21 DIAGNOSIS — M79.661 PAIN IN RIGHT LOWER LEG: ICD-10-CM

## 2022-07-21 DIAGNOSIS — Z00.8 ENCOUNTER FOR OTHER GENERAL EXAMINATION: ICD-10-CM

## 2022-07-21 PROCEDURE — 93971 EXTREMITY STUDY: CPT | Mod: 26,RT

## 2022-07-21 PROCEDURE — 93971 EXTREMITY STUDY: CPT

## 2022-11-02 NOTE — ED ADULT NURSE NOTE - CARDIO ASSESSMENT
Soto Rebeka is requesting a refill on the following medications:  Requested Prescriptions     Pending Prescriptions Disp Refills    metFORMIN (GLUCOPHAGE-XR) 750 MG extended release tablet [Pharmacy Med Name: METFORMIN ER TAB 750MG GP] 180 tablet 0     Sig: TAKE 2 TABLETS DAILY WITH  BREAKFAST       Date of last visit: 9/7/2022  Date of next visit (if applicable):3/9/2023  Date of last refill: 8/16/2022  Pharmacy Name: Nah[facility] MA
WDL

## 2022-11-04 NOTE — H&P ADULT - BACK
COPD/PN Week 1 Survey    Flowsheet Row Responses   [facility] facility patient discharged from? [facility]   Does the patient have one of the following disease processes/diagnoses(primary or secondary)? COPD   Week 1 attempt successful? No   Unsuccessful attempts Attempt 1          NICK VAAC - Registered Nurse   No deformity or limitation of movement

## 2022-12-18 NOTE — CONSULT NOTE ADULT - CONSULT REQUESTED DATE/TIME
Department of Internal Medicine  Nephrology Progress Note      Events reviewed. SUBJECTIVE: We are following Ted Tse for PAWAN. Patient reports not feeling well.     PHYSICAL EXAM:      Vitals:    VITALS:  BP (!) 142/122   Pulse 96   Temp 97.8 °F (36.6 °C) (Axillary)   Resp (!) 40   Ht 5' 2\" (1.575 m)   Wt 150 lb (68 kg)   SpO2 94%   BMI 27.44 kg/m²   24HR INTAKE/OUTPUT:    Intake/Output Summary (Last 24 hours) at 12/18/2022 1135  Last data filed at 12/18/2022 0800  Gross per 24 hour   Intake 4609.24 ml   Output 486 ml   Net 4123.24 ml         Constitutional: Pale, chronically ill-appearing, in NAD  HEENT: Normocephalic, atraumatic, PERRLA  Respiratory: Diminished right lung base  Cardiovascular/Edema: RRR, normal S1, normal S2, no edema  Gastrointestinal: Soft, not distended, nontender  Neurologic: Nonfocal  Skin: Pale, dry, no lesions, no rash    Scheduled Meds:   pantoprazole (PROTONIX) 40 mg injection  40 mg IntraVENous Q12H    vancomycin  500 mg IntraVENous Once    potassium phosphate IVPB  20 mmol IntraVENous Once    albumin human-kjda  25 g IntraVENous Once    magnesium sulfate  2,000 mg IntraVENous Once    lidocaine PF  5 mL IntraDERmal Once    sodium chloride flush  5-40 mL IntraVENous 2 times per day    heparin flush  3 mL IntraVENous 2 times per day    metoclopramide  10 mg IntraVENous Q6H    piperacillin-tazobactam  4,500 mg IntraVENous Q12H    vancomycin (VANCOCIN) intermittent dosing (placeholder)   Other RX Placeholder    Arformoterol Tartrate  15 mcg Nebulization BID    budesonide  0.5 mg Nebulization BID    acetylcysteine  4 mL Inhalation BID    albuterol  2.5 mg Nebulization Q4H WA    metoprolol  5 mg IntraVENous Q6H    rosuvastatin  10 mg Oral Nightly    lidocaine  10 mL Other Once    sodium chloride flush  5-40 mL IntraVENous 2 times per day    levothyroxine  50 mcg Oral Daily    enoxaparin  30 mg SubCUTAneous Daily     Continuous Infusions:   PN-Adult  3 IN 1 Central Line (Custom)      sodium chloride      PN-Adult  3 IN 1 Central Line (Custom) 29.2 mL/hr at 12/18/22 0622    lactated ringers 75 mL/hr at 12/18/22 1013    sodium chloride       PRN Meds:.sodium chloride flush, sodium chloride, heparin flush, perflutren lipid microspheres, fentanNYL, oxyCODONE **OR** oxyCODONE, sodium chloride flush, sodium chloride, ondansetron **OR** ondansetron, magnesium hydroxide, acetaminophen **OR** acetaminophen, ipratropium-albuterol    DATA:    CBC with Differential:    Lab Results   Component Value Date/Time    WBC 12.1 12/18/2022 04:01 AM    RBC 2.36 12/18/2022 04:01 AM    HGB 8.0 12/18/2022 10:45 AM    HCT 24.1 12/18/2022 10:45 AM     12/18/2022 04:01 AM    MCV 89.0 12/18/2022 04:01 AM    MCH 29.7 12/18/2022 04:01 AM    MCHC 33.3 12/18/2022 04:01 AM    RDW 14.4 12/18/2022 04:01 AM    LYMPHOPCT 6.9 12/18/2022 04:01 AM    MONOPCT 6.6 12/18/2022 04:01 AM    BASOPCT 0.2 12/18/2022 04:01 AM    MONOSABS 0.80 12/18/2022 04:01 AM    LYMPHSABS 0.84 12/18/2022 04:01 AM    EOSABS 0.20 12/18/2022 04:01 AM    BASOSABS 0.02 12/18/2022 04:01 AM     CMP:    Lab Results   Component Value Date/Time     12/18/2022 04:01 AM    K 3.9 12/18/2022 04:01 AM    K 3.5 12/17/2022 05:53 AM     12/18/2022 04:01 AM    CO2 27 12/18/2022 04:01 AM    BUN 39 12/18/2022 04:01 AM    CREATININE 2.3 12/18/2022 04:01 AM    GFRAA >60 07/22/2022 01:42 PM    LABGLOM 21 12/18/2022 04:01 AM    GLUCOSE 182 12/18/2022 04:01 AM    PROT 4.4 12/18/2022 04:01 AM    LABALBU 1.9 12/18/2022 04:01 AM    LABALBU 4.3 01/24/2011 01:15 PM    CALCIUM 7.9 12/18/2022 04:01 AM    BILITOT 0.4 12/18/2022 04:01 AM    ALKPHOS 59 12/18/2022 04:01 AM    AST 61 12/18/2022 04:01 AM    ALT 52 12/18/2022 04:01 AM     Magnesium:    Lab Results   Component Value Date/Time    MG 1.8 12/18/2022 04:01 AM     Phosphorus:    Lab Results   Component Value Date/Time    PHOS 1.4 12/18/2022 04:01 AM     Radiology Review:      CT Chest and Abd/Pelvis 12/14/22      No evidence of pulmonary embolism. Bilateral lower lobe pulmonary consolidations and pleural effusions seen. Deviation of the mediastinum to the left is visualized. Mild soft tissue prominence visualized surrounding the esophagus with   narrowing at the gastroesophageal junction but appears to be due to external   compression. Cannot rule left soft tissue mass at this location. Wall thickening visualized in the gastric outlet with mild prominence of the   stomach seen, this could be artifactual.     Distended gallbladder is seen. Degenerative bone changes, postoperative changes and multilevel vertebral   augmentation seen. No evidence of acute abdominal/pelvic pathology. US Chest 12/15/22       Probable complex loculated right pleural effusion. Simple left pleural effusion. BRIEF SUMMARY OF INITIAL CONSULT:    Briefly, Ryne iSlvestre is a 68-year-old female, past medical history significant for hypothyroidism, hiatal hernia and TIA, who presented to the ED on 12/14/2022 from [facility], patient recently underwent a hiatal hernia repair on 12/9/22, developed acute respiratory distress and was transferred to [facility] on 12/14/2022 for further management and treatment. Initial ED work-up revealed creatinine level of 1.7, patient then underwent two seperate IV contrast studies CTA and creatinine subsequently increased to 2.5, on 12/15/2022 creatinine level increased to 3.5 mg/dL, reason for this consultation. Review of records from [facility] reveals that baseline creatinine appears to be between 0.9 to 1.0 mg/dL, creatinine was 1.0 on 12/13/2022, patient was started on lisinopril during hospitalization, was given multiple doses IV furosemide with poor oral intake and vomiting.     Problems resolved:  HAGMA, 2/2 uremia and starvation ketoacidosis    IMPRESSION/RECOMMENDATIONS:     PAWAN, stage II, nonoliguric, ATN (ischemic +/- contrast induced , intravascular volume 16-Jun-2017 07:14 depletion due to poor oral intake, loop diuretics and vomiting in the setting of ACE inhibition versus,) FEUrea 28.3%, FENa 1.1% . creatinine level seem to be plateauing without further renal function recovery, will continue to monitor.     Elevated proBNP, 1373, to monitor  Hypophosphatemia, phosphorus levels 1.4, 2/2 poor intake/refeeding syndrome, rule out vitamin D deficiency    ------------------------------------------------------  New onset AF with RVR  Loculated right pleural effusion, s/p IR thoracentesis   Possibly sepsis, elevated procalcitonin, on piperacillin-tazobactam and vancomycin  S/p hiatal hernia repair 12/9/2022  Hypothyroidism, on levothyroxine  Persistent vomiting, PRN ondansetron, esophagram negative for perforation  Normocytic anemia, multifactorial  Severe hypoalbuminemia  Nutrition, on TPN per surgery      Plan:    Discontinue IV fluids  Continue TPN per surgery  Albumin 25 g every 8 hours x 6 doses  Replace phosphorus  Continue to monitor renal function      Electronically signed by Thomas Jaquez MD on 12/18/2022 at 11:35 AM

## 2023-01-24 ENCOUNTER — APPOINTMENT (OUTPATIENT)
Dept: ULTRASOUND IMAGING | Facility: CLINIC | Age: 63
End: 2023-01-24
Payer: COMMERCIAL

## 2023-01-24 ENCOUNTER — OUTPATIENT (OUTPATIENT)
Dept: OUTPATIENT SERVICES | Facility: HOSPITAL | Age: 63
LOS: 1 days | End: 2023-01-24
Payer: COMMERCIAL

## 2023-01-24 DIAGNOSIS — M79.661 PAIN IN RIGHT LOWER LEG: ICD-10-CM

## 2023-01-24 PROCEDURE — 93971 EXTREMITY STUDY: CPT

## 2023-01-24 PROCEDURE — 93971 EXTREMITY STUDY: CPT | Mod: 26,RT

## 2024-07-15 NOTE — ED ADULT NURSE NOTE - OBJECTIVE STATEMENT
No pt stating she has been having right flank pain radiating to right abdomen.  pt stating that she has been having vaginal bleeding only in the morning time.  Pt states she has been having vaginal bleeding and abdominal pain for days.  Denies fever, N/V, diarrhea

## 2024-08-14 NOTE — ED PROVIDER NOTE - MUSCULOSKELETAL, MLM
August 14, 2024      [facility] - Pediatric Diabetes  [address]  [facility] [address]  Phone: [phone]  Fax: [phone]       Patient: Oriana Peña   YOB: 2016  Date of Visit: 08/14/2024    To Whom It May Concern:    Indra Peña  was at [facility] on 08/14/2024. The patient may return to work/school on 08/14/2024 with no restrictions. If you have any questions or concerns, or if I can be of further assistance, please do not hesitate to contact me.    Sincerely,      Kaity Biggs RN      Spine appears normal, range of motion is not limited, no muscle or joint tenderness